# Patient Record
Sex: MALE | Race: WHITE | NOT HISPANIC OR LATINO | URBAN - METROPOLITAN AREA
[De-identification: names, ages, dates, MRNs, and addresses within clinical notes are randomized per-mention and may not be internally consistent; named-entity substitution may affect disease eponyms.]

---

## 2017-01-14 ENCOUNTER — INPATIENT (INPATIENT)
Facility: HOSPITAL | Age: 82
LOS: 5 days | Discharge: HOME | DRG: 178 | End: 2017-01-20
Attending: GENERAL ACUTE CARE HOSPITAL | Admitting: GENERAL ACUTE CARE HOSPITAL
Payer: MEDICARE

## 2017-01-14 VITALS
SYSTOLIC BLOOD PRESSURE: 123 MMHG | HEART RATE: 84 BPM | DIASTOLIC BLOOD PRESSURE: 78 MMHG | TEMPERATURE: 99 F | RESPIRATION RATE: 22 BRPM | OXYGEN SATURATION: 100 %

## 2017-01-14 DIAGNOSIS — E11.22 TYPE 2 DIABETES MELLITUS WITH DIABETIC CHRONIC KIDNEY DISEASE: ICD-10-CM

## 2017-01-14 DIAGNOSIS — I31.3 PERICARDIAL EFFUSION (NONINFLAMMATORY): ICD-10-CM

## 2017-01-14 DIAGNOSIS — J18.9 PNEUMONIA, UNSPECIFIED ORGANISM: ICD-10-CM

## 2017-01-14 DIAGNOSIS — Z98.89 OTHER SPECIFIED POSTPROCEDURAL STATES: Chronic | ICD-10-CM

## 2017-01-14 DIAGNOSIS — E03.9 HYPOTHYROIDISM, UNSPECIFIED: ICD-10-CM

## 2017-01-14 DIAGNOSIS — N18.3 CHRONIC KIDNEY DISEASE, STAGE 3 (MODERATE): ICD-10-CM

## 2017-01-14 DIAGNOSIS — Z90.49 ACQUIRED ABSENCE OF OTHER SPECIFIED PARTS OF DIGESTIVE TRACT: Chronic | ICD-10-CM

## 2017-01-14 DIAGNOSIS — F32.9 MAJOR DEPRESSIVE DISORDER, SINGLE EPISODE, UNSPECIFIED: ICD-10-CM

## 2017-01-14 DIAGNOSIS — S32.9XXA FRACTURE OF UNSPECIFIED PARTS OF LUMBOSACRAL SPINE AND PELVIS, INITIAL ENCOUNTER FOR CLOSED FRACTURE: Chronic | ICD-10-CM

## 2017-01-14 DIAGNOSIS — I48.91 UNSPECIFIED ATRIAL FIBRILLATION: ICD-10-CM

## 2017-01-14 LAB
ALBUMIN SERPL ELPH-MCNC: 3.7 G/DL — SIGNIFICANT CHANGE UP (ref 3.3–5)
ALP SERPL-CCNC: 96 U/L — SIGNIFICANT CHANGE UP (ref 40–120)
ALT FLD-CCNC: 18 U/L RC — SIGNIFICANT CHANGE UP (ref 10–45)
ANION GAP SERPL CALC-SCNC: 15 MMOL/L — SIGNIFICANT CHANGE UP (ref 5–17)
APPEARANCE UR: ABNORMAL
AST SERPL-CCNC: 19 U/L — SIGNIFICANT CHANGE UP (ref 10–40)
BACTERIA # UR AUTO: ABNORMAL /HPF
BASOPHILS # BLD AUTO: 0 K/UL — SIGNIFICANT CHANGE UP (ref 0–0.2)
BASOPHILS NFR BLD AUTO: 0.1 % — SIGNIFICANT CHANGE UP (ref 0–2)
BILIRUB SERPL-MCNC: 0.6 MG/DL — SIGNIFICANT CHANGE UP (ref 0.2–1.2)
BILIRUB UR-MCNC: NEGATIVE — SIGNIFICANT CHANGE UP
BUN SERPL-MCNC: 55 MG/DL — HIGH (ref 7–23)
CALCIUM SERPL-MCNC: 9.4 MG/DL — SIGNIFICANT CHANGE UP (ref 8.4–10.5)
CHLORIDE SERPL-SCNC: 104 MMOL/L — SIGNIFICANT CHANGE UP (ref 96–108)
CO2 SERPL-SCNC: 20 MMOL/L — LOW (ref 22–31)
COLOR SPEC: YELLOW — SIGNIFICANT CHANGE UP
CREAT SERPL-MCNC: 1.56 MG/DL — HIGH (ref 0.5–1.3)
DIFF PNL FLD: ABNORMAL
EOSINOPHIL # BLD AUTO: 0 K/UL — SIGNIFICANT CHANGE UP (ref 0–0.5)
EOSINOPHIL NFR BLD AUTO: 0.1 % — SIGNIFICANT CHANGE UP (ref 0–6)
GAS PNL BLDV: SIGNIFICANT CHANGE UP
GLUCOSE SERPL-MCNC: 198 MG/DL — HIGH (ref 70–99)
GLUCOSE UR QL: NEGATIVE — SIGNIFICANT CHANGE UP
HCT VFR BLD CALC: 29.5 % — LOW (ref 39–50)
HGB BLD-MCNC: 9.9 G/DL — LOW (ref 13–17)
KETONES UR-MCNC: NEGATIVE — SIGNIFICANT CHANGE UP
LEUKOCYTE ESTERASE UR-ACNC: ABNORMAL
LYMPHOCYTES # BLD AUTO: 1 K/UL — SIGNIFICANT CHANGE UP (ref 1–3.3)
LYMPHOCYTES # BLD AUTO: 6.4 % — LOW (ref 13–44)
MCHC RBC-ENTMCNC: 30.2 PG — SIGNIFICANT CHANGE UP (ref 27–34)
MCHC RBC-ENTMCNC: 33.5 GM/DL — SIGNIFICANT CHANGE UP (ref 32–36)
MCV RBC AUTO: 90 FL — SIGNIFICANT CHANGE UP (ref 80–100)
MONOCYTES # BLD AUTO: 1 K/UL — HIGH (ref 0–0.9)
MONOCYTES NFR BLD AUTO: 6.7 % — SIGNIFICANT CHANGE UP (ref 2–14)
NEUTROPHILS # BLD AUTO: 13.2 K/UL — HIGH (ref 1.8–7.4)
NEUTROPHILS NFR BLD AUTO: 86.7 % — HIGH (ref 43–77)
NITRITE UR-MCNC: NEGATIVE — SIGNIFICANT CHANGE UP
NT-PROBNP SERPL-SCNC: 4697 PG/ML — HIGH (ref 0–300)
PH UR: 6 — SIGNIFICANT CHANGE UP (ref 4.8–8)
PLATELET # BLD AUTO: 265 K/UL — SIGNIFICANT CHANGE UP (ref 150–400)
POTASSIUM SERPL-MCNC: 4.4 MMOL/L — SIGNIFICANT CHANGE UP (ref 3.5–5.3)
POTASSIUM SERPL-SCNC: 4.4 MMOL/L — SIGNIFICANT CHANGE UP (ref 3.5–5.3)
PROT SERPL-MCNC: 7.7 G/DL — SIGNIFICANT CHANGE UP (ref 6–8.3)
PROT UR-MCNC: >300 MG/DL — SIGNIFICANT CHANGE UP
RAPID RVP RESULT: SIGNIFICANT CHANGE UP
RBC # BLD: 3.28 M/UL — LOW (ref 4.2–5.8)
RBC # FLD: 14 % — SIGNIFICANT CHANGE UP (ref 10.3–14.5)
RBC CASTS # UR COMP ASSIST: ABNORMAL /HPF (ref 0–2)
SODIUM SERPL-SCNC: 139 MMOL/L — SIGNIFICANT CHANGE UP (ref 135–145)
SP GR SPEC: 1.02 — SIGNIFICANT CHANGE UP (ref 1.01–1.02)
TROPONIN T SERPL-MCNC: 0.06 NG/ML — SIGNIFICANT CHANGE UP (ref 0–0.06)
UROBILINOGEN FLD QL: NEGATIVE — SIGNIFICANT CHANGE UP
WBC # BLD: 15.2 K/UL — HIGH (ref 3.8–10.5)
WBC # FLD AUTO: 15.2 K/UL — HIGH (ref 3.8–10.5)
WBC UR QL: ABNORMAL /HPF (ref 0–5)

## 2017-01-14 PROCEDURE — 76604 US EXAM CHEST: CPT | Mod: 26

## 2017-01-14 PROCEDURE — 71010: CPT | Mod: 26

## 2017-01-14 PROCEDURE — 99291 CRITICAL CARE FIRST HOUR: CPT | Mod: 25,GC

## 2017-01-14 PROCEDURE — 93010 ELECTROCARDIOGRAM REPORT: CPT

## 2017-01-14 PROCEDURE — 99223 1ST HOSP IP/OBS HIGH 75: CPT

## 2017-01-14 PROCEDURE — 93308 TTE F-UP OR LMTD: CPT | Mod: 26

## 2017-01-14 RX ORDER — ACETYLCYSTEINE 200 MG/ML
3 VIAL (ML) MISCELLANEOUS ONCE
Qty: 0 | Refills: 0 | Status: COMPLETED | OUTPATIENT
Start: 2017-01-14 | End: 2017-01-14

## 2017-01-14 RX ORDER — INSULIN GLARGINE 100 [IU]/ML
12 INJECTION, SOLUTION SUBCUTANEOUS AT BEDTIME
Qty: 0 | Refills: 0 | Status: DISCONTINUED | OUTPATIENT
Start: 2017-01-14 | End: 2017-01-14

## 2017-01-14 RX ORDER — SODIUM CHLORIDE 9 MG/ML
1000 INJECTION, SOLUTION INTRAVENOUS
Qty: 0 | Refills: 0 | Status: DISCONTINUED | OUTPATIENT
Start: 2017-01-14 | End: 2017-01-20

## 2017-01-14 RX ORDER — DEXTROSE 50 % IN WATER 50 %
25 SYRINGE (ML) INTRAVENOUS ONCE
Qty: 0 | Refills: 0 | Status: DISCONTINUED | OUTPATIENT
Start: 2017-01-14 | End: 2017-01-20

## 2017-01-14 RX ORDER — LATANOPROST 0.05 MG/ML
1 SOLUTION/ DROPS OPHTHALMIC; TOPICAL AT BEDTIME
Qty: 0 | Refills: 0 | Status: DISCONTINUED | OUTPATIENT
Start: 2017-01-14 | End: 2017-01-20

## 2017-01-14 RX ORDER — CEFEPIME 1 G/1
1000 INJECTION, POWDER, FOR SOLUTION INTRAMUSCULAR; INTRAVENOUS ONCE
Qty: 0 | Refills: 0 | Status: COMPLETED | OUTPATIENT
Start: 2017-01-14 | End: 2017-01-14

## 2017-01-14 RX ORDER — GLUCAGON INJECTION, SOLUTION 0.5 MG/.1ML
1 INJECTION, SOLUTION SUBCUTANEOUS ONCE
Qty: 0 | Refills: 0 | Status: DISCONTINUED | OUTPATIENT
Start: 2017-01-14 | End: 2017-01-20

## 2017-01-14 RX ORDER — INSULIN GLARGINE 100 [IU]/ML
6 INJECTION, SOLUTION SUBCUTANEOUS AT BEDTIME
Qty: 0 | Refills: 0 | Status: DISCONTINUED | OUTPATIENT
Start: 2017-01-14 | End: 2017-01-18

## 2017-01-14 RX ORDER — ASPIRIN/CALCIUM CARB/MAGNESIUM 324 MG
81 TABLET ORAL DAILY
Qty: 0 | Refills: 0 | Status: DISCONTINUED | OUTPATIENT
Start: 2017-01-14 | End: 2017-01-20

## 2017-01-14 RX ORDER — FUROSEMIDE 40 MG
40 TABLET ORAL ONCE
Qty: 0 | Refills: 0 | Status: COMPLETED | OUTPATIENT
Start: 2017-01-14 | End: 2017-01-14

## 2017-01-14 RX ORDER — AZITHROMYCIN 500 MG/1
500 TABLET, FILM COATED ORAL ONCE
Qty: 0 | Refills: 0 | Status: COMPLETED | OUTPATIENT
Start: 2017-01-14 | End: 2017-01-14

## 2017-01-14 RX ORDER — DEXTROSE 50 % IN WATER 50 %
1 SYRINGE (ML) INTRAVENOUS ONCE
Qty: 0 | Refills: 0 | Status: DISCONTINUED | OUTPATIENT
Start: 2017-01-14 | End: 2017-01-20

## 2017-01-14 RX ORDER — INSULIN LISPRO 100/ML
VIAL (ML) SUBCUTANEOUS AT BEDTIME
Qty: 0 | Refills: 0 | Status: DISCONTINUED | OUTPATIENT
Start: 2017-01-14 | End: 2017-01-16

## 2017-01-14 RX ORDER — PIPERACILLIN AND TAZOBACTAM 4; .5 G/20ML; G/20ML
3.38 INJECTION, POWDER, LYOPHILIZED, FOR SOLUTION INTRAVENOUS ONCE
Qty: 0 | Refills: 0 | Status: COMPLETED | OUTPATIENT
Start: 2017-01-14 | End: 2017-01-14

## 2017-01-14 RX ORDER — TAMSULOSIN HYDROCHLORIDE 0.4 MG/1
0.4 CAPSULE ORAL
Qty: 0 | Refills: 0 | Status: DISCONTINUED | OUTPATIENT
Start: 2017-01-14 | End: 2017-01-20

## 2017-01-14 RX ORDER — CEFTRIAXONE 500 MG/1
1 INJECTION, POWDER, FOR SOLUTION INTRAMUSCULAR; INTRAVENOUS EVERY 24 HOURS
Qty: 0 | Refills: 0 | Status: DISCONTINUED | OUTPATIENT
Start: 2017-01-15 | End: 2017-01-18

## 2017-01-14 RX ORDER — DEXTROSE 50 % IN WATER 50 %
12.5 SYRINGE (ML) INTRAVENOUS ONCE
Qty: 0 | Refills: 0 | Status: DISCONTINUED | OUTPATIENT
Start: 2017-01-14 | End: 2017-01-20

## 2017-01-14 RX ORDER — HEPARIN SODIUM 5000 [USP'U]/ML
5000 INJECTION INTRAVENOUS; SUBCUTANEOUS EVERY 8 HOURS
Qty: 0 | Refills: 0 | Status: DISCONTINUED | OUTPATIENT
Start: 2017-01-14 | End: 2017-01-18

## 2017-01-14 RX ORDER — INSULIN LISPRO 100/ML
VIAL (ML) SUBCUTANEOUS
Qty: 0 | Refills: 0 | Status: DISCONTINUED | OUTPATIENT
Start: 2017-01-14 | End: 2017-01-15

## 2017-01-14 RX ORDER — VANCOMYCIN HCL 1 G
1000 VIAL (EA) INTRAVENOUS ONCE
Qty: 0 | Refills: 0 | Status: COMPLETED | OUTPATIENT
Start: 2017-01-14 | End: 2017-01-14

## 2017-01-14 RX ORDER — IPRATROPIUM/ALBUTEROL SULFATE 18-103MCG
3 AEROSOL WITH ADAPTER (GRAM) INHALATION EVERY 6 HOURS
Qty: 0 | Refills: 0 | Status: DISCONTINUED | OUTPATIENT
Start: 2017-01-14 | End: 2017-01-20

## 2017-01-14 RX ORDER — DOXAZOSIN MESYLATE 4 MG
4 TABLET ORAL AT BEDTIME
Qty: 0 | Refills: 0 | Status: DISCONTINUED | OUTPATIENT
Start: 2017-01-14 | End: 2017-01-20

## 2017-01-14 RX ORDER — IPRATROPIUM BROMIDE 0.2 MG/ML
500 SOLUTION, NON-ORAL INHALATION EVERY 6 HOURS
Qty: 0 | Refills: 0 | Status: DISCONTINUED | OUTPATIENT
Start: 2017-01-14 | End: 2017-01-20

## 2017-01-14 RX ORDER — LEVOTHYROXINE SODIUM 125 MCG
100 TABLET ORAL DAILY
Qty: 0 | Refills: 0 | Status: DISCONTINUED | OUTPATIENT
Start: 2017-01-14 | End: 2017-01-20

## 2017-01-14 RX ORDER — ASCORBIC ACID 60 MG
500 TABLET,CHEWABLE ORAL DAILY
Qty: 0 | Refills: 0 | Status: DISCONTINUED | OUTPATIENT
Start: 2017-01-14 | End: 2017-01-20

## 2017-01-14 RX ORDER — SERTRALINE 25 MG/1
50 TABLET, FILM COATED ORAL DAILY
Qty: 0 | Refills: 0 | Status: DISCONTINUED | OUTPATIENT
Start: 2017-01-14 | End: 2017-01-20

## 2017-01-14 RX ADMIN — Medication 81 MILLIGRAM(S): at 17:16

## 2017-01-14 RX ADMIN — TAMSULOSIN HYDROCHLORIDE 0.4 MILLIGRAM(S): 0.4 CAPSULE ORAL at 17:16

## 2017-01-14 RX ADMIN — HEPARIN SODIUM 5000 UNIT(S): 5000 INJECTION INTRAVENOUS; SUBCUTANEOUS at 22:20

## 2017-01-14 RX ADMIN — Medication 4 MILLIGRAM(S): at 22:33

## 2017-01-14 RX ADMIN — Medication 500 MILLIGRAM(S): at 17:16

## 2017-01-14 RX ADMIN — Medication 40 MILLIGRAM(S): at 11:44

## 2017-01-14 RX ADMIN — Medication 1 TABLET(S): at 17:16

## 2017-01-14 RX ADMIN — AZITHROMYCIN 250 MILLIGRAM(S): 500 TABLET, FILM COATED ORAL at 11:07

## 2017-01-14 RX ADMIN — Medication 100 MICROGRAM(S): at 17:16

## 2017-01-14 RX ADMIN — Medication 3 MILLILITER(S): at 15:09

## 2017-01-14 RX ADMIN — INSULIN GLARGINE 6 UNIT(S): 100 INJECTION, SOLUTION SUBCUTANEOUS at 22:33

## 2017-01-14 RX ADMIN — Medication 250 MILLIGRAM(S): at 09:19

## 2017-01-14 RX ADMIN — CEFEPIME 100 MILLIGRAM(S): 1 INJECTION, POWDER, FOR SOLUTION INTRAMUSCULAR; INTRAVENOUS at 11:29

## 2017-01-14 NOTE — H&P ADULT. - GASTROINTESTINAL DETAILS
soft/no rebound tenderness/no masses palpable/no bruit/nontender/no distention/bowel sounds normal/no guarding

## 2017-01-14 NOTE — H&P ADULT. - PROBLEM SELECTOR PLAN 1
1. Admit to medicine.  2. ID consulted, recommending to stop vanco/cefepime/azithromycin and start ceftriaxone 1 g IV q24h and doxycycline 100 mg PO daily. 1. Admit to medicine.  2. ID consulted, recommending to stop vanco/cefepime/azithromycin and start ceftriaxone 1 g IV q24h and doxycycline 100 mg PO daily.  3. F/U blood cultures.  4. Check urine for legionella.  5. Guaifenesin ATC  6. Chest PT for secretions.  7. Aspiration precautions.

## 2017-01-14 NOTE — ED PROVIDER NOTE - MEDICAL DECISION MAKING DETAILS
89 y.o. male pw shortness of breath and productive cough, concern for PNA. Will check labs, cxr, US, rvp, reevaluate.

## 2017-01-14 NOTE — H&P ADULT. - HISTORY OF PRESENT ILLNESS
89 year old male with PMHx of atrial fibrillation on aspirin (not on anticoagulation due to history of RP bleeding), T2DM, CKD stage III, BPH with chronic indwelling Cabrera, hypothyroidism, depression presenting from assisted living for evaluation of cough. The patient states that 2-3 days ago, he developed a cough. The cough is associated with sputum production, but it is difficult for him to expectorate. When he does, the sputum is dark in color. He also reports subjective dyspnea on exertion. He denies fever, chills, nausea, or vomiting, but does report a few loose stools over the past 24 hours. No recent antibiotic exposure. Was hospitalized here approximately one month ago. Denies sick contacts. He was recently prescribed a nasal spray to help with his mucous, but made symptoms worse and more difficult for him to cough up mucous. This has been associated with a slight change in his voice as well.

## 2017-01-14 NOTE — ED PROVIDER NOTE - OBJECTIVE STATEMENT
89 y.o. male hx of CAD, CHF, HTN from [facility] pw productive cough x 1 week, now with worsening shortness of breath since last night and difficulty breathing. Pt denies fevers. No chest pain.     ROS: denies HA, new weakness, dizziness, fevers/chills, nausea/vomiting, chest pain, diaphoresis, abdominal pain, back/neck pain, dysuria/hematuria, or rash

## 2017-01-14 NOTE — ED ADULT NURSE NOTE - OBJECTIVE STATEMENT
Pt arrived to ER from assisted living c/o cough that he has had for about a week that has progressed to increasing SOB and PANIAGUA since yesterday.  Pt aaox3, pt 100 on room air, pt with audible junky secrections in upper airway.  Pt with rales and rhonchi audible bilaterally.  Pt in atrial fibrillation, pt denies any CP or dizziness.  Pt VSS, will continue to monitor.

## 2017-01-14 NOTE — H&P ADULT. - LAB RESULTS AND INTERPRETATION
Leukocytosis. Normocytic anemia near baseline. Elevated BUN and creatnine, near baseline. BNP elevated to 4700.

## 2017-01-14 NOTE — ED PROVIDER NOTE - CARE PLAN
Principal Discharge DX:	Pneumonia of right lung due to infectious organism, unspecified part of lung

## 2017-01-14 NOTE — H&P ADULT. - PMH
Anemia    Atrial fibrillation, unspecified type    Back pain  chronic  BPH (Benign Prostatic Hyperplasia)    CKD (chronic kidney disease)    DM2 (diabetes mellitus, type 2)    Gallstone pancreatitis  2013  Glaucoma    Hepatitis  unknown kind, in the [employer] 1940's  LFT's wnl 3/2015  Hypothyroidism    Pneumonia    Retroperitoneal bleed

## 2017-01-14 NOTE — ED PROVIDER NOTE - ATTENDING CONTRIBUTION TO CARE
I performed a face to face evaluation of this patient and performed a history and physical examination on the patient.  I agree with the resident's history, physical examination, and plan of the patient. patient from assisted living complaining of sob x 1 week, cough. as per family patient with voice change as well. in ED, non labored breathing, rhonchorous, on NC. US shows right sided pneumonia and bilateral B-lines. patient in chf exacerbation as well from pneumonia.

## 2017-01-14 NOTE — ED PROVIDER NOTE - PROGRESS NOTE DETAILS
Attending Note (Derian): family called and stated has penicillin allergy. not previously documented. patient received small amount of zosyn. med stopped once informed. will monitor for signs of allergic reaction. no signs of allergic reaction at this time.

## 2017-01-14 NOTE — H&P ADULT. - PROBLEM SELECTOR PLAN 7
BUN/creatinine near baseline.  1. Trend BMP.  2. Maintain Cabrera.  3. Outpt urology follow-up.  4. Renal dosing of medications.

## 2017-01-14 NOTE — ED ADULT NURSE NOTE - PSH
Closed pelvic fracture  11/2015  S/P appendectomy  as a child  S/P cholecystectomy  6/2013  S/P hernia repair    S/P hip replacement  bilateral, in his 70's  S/P shoulder joint replacement  right

## 2017-01-14 NOTE — H&P ADULT. - PROBLEM SELECTOR PLAN 2
Rate controlled. Cardiology consult appreciated. No evidence for rapid rhythm or significant CHF. Patient received lasix in ED.  1. Check EKG.  2. Continue aspirin.  3. Cardiology follow up.

## 2017-01-14 NOTE — H&P ADULT. - PROBLEM SELECTOR PLAN 4
Likely chronic and stable; noted on previous TTE.  1. Repeat TTE, inpatient vs. outpatient.  2. Cardiology f/u.

## 2017-01-14 NOTE — ED PROVIDER NOTE - CRITICAL CARE PROVIDED
documentation/telephone consultation with the patient's family/consult w/ pt's family directly relating to pts condition/interpretation of diagnostic studies/direct patient care (not related to procedure)/additional history taking

## 2017-01-14 NOTE — H&P ADULT. - RADIOLOGY RESULTS AND INTERPRETATION
CXR personally reviewed: Clear lungs  TTE performed in ED: Small pericardial effusion (was noted on TTE from 2015)

## 2017-01-14 NOTE — H&P ADULT. - ASSESSMENT
89 year old male with PMHx of atrial fibrillation on aspirin (not on anticoagulation due to history of RP bleeding), T2DM, CKD stage III, BPH with chronic indwelling Cabrera, hypothyroidism, depression presenting from assisted living for evaluation of cough. Here, with tachypnea, leukocytosis, and BUN/creatinine near baseline. RVP and CXR negative. Exam with diffuse rhonchi. Suspect bronchitis vs. community acquired pneumonia. Although BNP is elevated (which may be exacerbated by renal disease), doubt significant CHF exacerbation given lack of LE edema and benign appearance of chest XR.

## 2017-01-15 LAB
ALBUMIN SERPL ELPH-MCNC: 3.6 G/DL — SIGNIFICANT CHANGE UP (ref 3.3–5)
ALP SERPL-CCNC: 92 U/L — SIGNIFICANT CHANGE UP (ref 40–120)
ALT FLD-CCNC: 14 U/L — SIGNIFICANT CHANGE UP (ref 10–45)
ANION GAP SERPL CALC-SCNC: 17 MMOL/L — SIGNIFICANT CHANGE UP (ref 5–17)
AST SERPL-CCNC: 18 U/L — SIGNIFICANT CHANGE UP (ref 10–40)
BASOPHILS # BLD AUTO: 0.03 K/UL — SIGNIFICANT CHANGE UP (ref 0–0.2)
BASOPHILS NFR BLD AUTO: 0.2 % — SIGNIFICANT CHANGE UP (ref 0–2)
BILIRUB SERPL-MCNC: 0.6 MG/DL — SIGNIFICANT CHANGE UP (ref 0.2–1.2)
BUN SERPL-MCNC: 58 MG/DL — HIGH (ref 7–23)
CALCIUM SERPL-MCNC: 9.5 MG/DL — SIGNIFICANT CHANGE UP (ref 8.4–10.5)
CHLORIDE SERPL-SCNC: 105 MMOL/L — SIGNIFICANT CHANGE UP (ref 96–108)
CO2 SERPL-SCNC: 19 MMOL/L — LOW (ref 22–31)
CREAT SERPL-MCNC: 1.6 MG/DL — HIGH (ref 0.5–1.3)
CULTURE RESULTS: SIGNIFICANT CHANGE UP
EOSINOPHIL # BLD AUTO: 0.09 K/UL — SIGNIFICANT CHANGE UP (ref 0–0.5)
EOSINOPHIL NFR BLD AUTO: 0.7 % — SIGNIFICANT CHANGE UP (ref 0–6)
GLUCOSE SERPL-MCNC: 146 MG/DL — HIGH (ref 70–99)
HCT VFR BLD CALC: 30.2 % — LOW (ref 39–50)
HGB BLD-MCNC: 9.7 G/DL — LOW (ref 13–17)
IMM GRANULOCYTES NFR BLD AUTO: 0.3 % — SIGNIFICANT CHANGE UP (ref 0–1.5)
LEGIONELLA AG UR QL: NEGATIVE — SIGNIFICANT CHANGE UP
LYMPHOCYTES # BLD AUTO: 1.8 K/UL — SIGNIFICANT CHANGE UP (ref 1–3.3)
LYMPHOCYTES # BLD AUTO: 14 % — SIGNIFICANT CHANGE UP (ref 13–44)
MCHC RBC-ENTMCNC: 28.4 PG — SIGNIFICANT CHANGE UP (ref 27–34)
MCHC RBC-ENTMCNC: 32.1 GM/DL — SIGNIFICANT CHANGE UP (ref 32–36)
MCV RBC AUTO: 88.6 FL — SIGNIFICANT CHANGE UP (ref 80–100)
MONOCYTES # BLD AUTO: 1.06 K/UL — HIGH (ref 0–0.9)
MONOCYTES NFR BLD AUTO: 8.2 % — SIGNIFICANT CHANGE UP (ref 2–14)
NEUTROPHILS # BLD AUTO: 9.85 K/UL — HIGH (ref 1.8–7.4)
NEUTROPHILS NFR BLD AUTO: 76.6 % — SIGNIFICANT CHANGE UP (ref 43–77)
PLATELET # BLD AUTO: 314 K/UL — SIGNIFICANT CHANGE UP (ref 150–400)
POTASSIUM SERPL-MCNC: 4.4 MMOL/L — SIGNIFICANT CHANGE UP (ref 3.5–5.3)
POTASSIUM SERPL-SCNC: 4.4 MMOL/L — SIGNIFICANT CHANGE UP (ref 3.5–5.3)
PROT SERPL-MCNC: 7.1 G/DL — SIGNIFICANT CHANGE UP (ref 6–8.3)
RBC # BLD: 3.41 M/UL — LOW (ref 4.2–5.8)
RBC # FLD: 15.8 % — HIGH (ref 10.3–14.5)
SODIUM SERPL-SCNC: 141 MMOL/L — SIGNIFICANT CHANGE UP (ref 135–145)
SPECIMEN SOURCE: SIGNIFICANT CHANGE UP
WBC # BLD: 12.87 K/UL — HIGH (ref 3.8–10.5)
WBC # FLD AUTO: 12.87 K/UL — HIGH (ref 3.8–10.5)

## 2017-01-15 RX ORDER — INSULIN LISPRO 100/ML
VIAL (ML) SUBCUTANEOUS EVERY 6 HOURS
Qty: 0 | Refills: 0 | Status: DISCONTINUED | OUTPATIENT
Start: 2017-01-15 | End: 2017-01-15

## 2017-01-15 RX ORDER — SODIUM CHLORIDE 9 MG/ML
1000 INJECTION INTRAMUSCULAR; INTRAVENOUS; SUBCUTANEOUS
Qty: 0 | Refills: 0 | Status: DISCONTINUED | OUTPATIENT
Start: 2017-01-15 | End: 2017-01-20

## 2017-01-15 RX ORDER — INSULIN LISPRO 100/ML
VIAL (ML) SUBCUTANEOUS
Qty: 0 | Refills: 0 | Status: DISCONTINUED | OUTPATIENT
Start: 2017-01-15 | End: 2017-01-16

## 2017-01-15 RX ADMIN — Medication 500 MICROGRAM(S): at 17:46

## 2017-01-15 RX ADMIN — Medication 2: at 17:46

## 2017-01-15 RX ADMIN — Medication 30 MILLIGRAM(S): at 21:55

## 2017-01-15 RX ADMIN — Medication 500 MILLIGRAM(S): at 12:54

## 2017-01-15 RX ADMIN — LATANOPROST 1 DROP(S): 0.05 SOLUTION/ DROPS OPHTHALMIC; TOPICAL at 00:39

## 2017-01-15 RX ADMIN — Medication 100 MILLIGRAM(S): at 05:42

## 2017-01-15 RX ADMIN — SODIUM CHLORIDE 40 MILLILITER(S): 9 INJECTION INTRAMUSCULAR; INTRAVENOUS; SUBCUTANEOUS at 13:00

## 2017-01-15 RX ADMIN — Medication 1 TABLET(S): at 12:54

## 2017-01-15 RX ADMIN — TAMSULOSIN HYDROCHLORIDE 0.4 MILLIGRAM(S): 0.4 CAPSULE ORAL at 17:47

## 2017-01-15 RX ADMIN — Medication 3 MILLILITER(S): at 05:42

## 2017-01-15 RX ADMIN — Medication 3 MILLILITER(S): at 12:53

## 2017-01-15 RX ADMIN — Medication 3 MILLILITER(S): at 00:39

## 2017-01-15 RX ADMIN — Medication 500 MICROGRAM(S): at 12:53

## 2017-01-15 RX ADMIN — TAMSULOSIN HYDROCHLORIDE 0.4 MILLIGRAM(S): 0.4 CAPSULE ORAL at 05:43

## 2017-01-15 RX ADMIN — Medication 100 MICROGRAM(S): at 05:42

## 2017-01-15 RX ADMIN — Medication 3 MILLILITER(S): at 17:46

## 2017-01-15 RX ADMIN — SERTRALINE 50 MILLIGRAM(S): 25 TABLET, FILM COATED ORAL at 12:54

## 2017-01-15 RX ADMIN — Medication 100 MILLIGRAM(S): at 17:50

## 2017-01-15 RX ADMIN — Medication 81 MILLIGRAM(S): at 12:54

## 2017-01-15 RX ADMIN — Medication 500 MICROGRAM(S): at 05:42

## 2017-01-15 RX ADMIN — HEPARIN SODIUM 5000 UNIT(S): 5000 INJECTION INTRAVENOUS; SUBCUTANEOUS at 21:56

## 2017-01-15 RX ADMIN — HEPARIN SODIUM 5000 UNIT(S): 5000 INJECTION INTRAVENOUS; SUBCUTANEOUS at 05:42

## 2017-01-15 RX ADMIN — CEFTRIAXONE 100 GRAM(S): 500 INJECTION, POWDER, FOR SOLUTION INTRAMUSCULAR; INTRAVENOUS at 09:00

## 2017-01-15 RX ADMIN — Medication 4 MILLIGRAM(S): at 21:55

## 2017-01-15 RX ADMIN — INSULIN GLARGINE 6 UNIT(S): 100 INJECTION, SOLUTION SUBCUTANEOUS at 21:55

## 2017-01-15 RX ADMIN — LATANOPROST 1 DROP(S): 0.05 SOLUTION/ DROPS OPHTHALMIC; TOPICAL at 21:56

## 2017-01-15 RX ADMIN — HEPARIN SODIUM 5000 UNIT(S): 5000 INJECTION INTRAVENOUS; SUBCUTANEOUS at 13:00

## 2017-01-15 RX ADMIN — Medication 500 MICROGRAM(S): at 00:39

## 2017-01-15 NOTE — PROVIDER CONTACT NOTE (OTHER) - ASSESSMENT
KONSTANTIN Russo aware. RN d/w Pt and family risks of eating re asp PNA. If family still persists to ask for diet KONSTANTIN Russo will come and d/w risks with Pt and family. Family aware that hospital not responsible if Pt gets asp PNA if they do decide to request diet. Family wishes to hold off on diet at this time

## 2017-01-15 NOTE — PROVIDER CONTACT NOTE (OTHER) - ASSESSMENT
Pt and Pt family educated on risks of not complying with recommended treatment. Pt and Pt family aware of risk of asp PNA. Pt and Pt family still wish to have diet. KONSTANTIN Russo to d/w further with family

## 2017-01-15 NOTE — PROVIDER CONTACT NOTE (OTHER) - SITUATION
Pt dx with PNA 1/14, NPO at this time pending s&s, hx of DM2, RN asking for FS q6 instead of FSACHS. Pt also came with chronic hunt, RN asking for hunt order

## 2017-01-16 LAB
ANION GAP SERPL CALC-SCNC: 15 MMOL/L — SIGNIFICANT CHANGE UP (ref 5–17)
BUN SERPL-MCNC: 53 MG/DL — HIGH (ref 7–23)
CALCIUM SERPL-MCNC: 9.2 MG/DL — SIGNIFICANT CHANGE UP (ref 8.4–10.5)
CHLORIDE SERPL-SCNC: 107 MMOL/L — SIGNIFICANT CHANGE UP (ref 96–108)
CO2 SERPL-SCNC: 20 MMOL/L — LOW (ref 22–31)
CREAT SERPL-MCNC: 1.49 MG/DL — HIGH (ref 0.5–1.3)
GLUCOSE SERPL-MCNC: 154 MG/DL — HIGH (ref 70–99)
HCT VFR BLD CALC: 29.3 % — LOW (ref 39–50)
HGB BLD-MCNC: 9.4 G/DL — LOW (ref 13–17)
MCHC RBC-ENTMCNC: 28.6 PG — SIGNIFICANT CHANGE UP (ref 27–34)
MCHC RBC-ENTMCNC: 32.1 GM/DL — SIGNIFICANT CHANGE UP (ref 32–36)
MCV RBC AUTO: 89.1 FL — SIGNIFICANT CHANGE UP (ref 80–100)
PLATELET # BLD AUTO: 307 K/UL — SIGNIFICANT CHANGE UP (ref 150–400)
POTASSIUM SERPL-MCNC: 4.1 MMOL/L — SIGNIFICANT CHANGE UP (ref 3.5–5.3)
POTASSIUM SERPL-SCNC: 4.1 MMOL/L — SIGNIFICANT CHANGE UP (ref 3.5–5.3)
RBC # BLD: 3.29 M/UL — LOW (ref 4.2–5.8)
RBC # FLD: 15.7 % — HIGH (ref 10.3–14.5)
SODIUM SERPL-SCNC: 142 MMOL/L — SIGNIFICANT CHANGE UP (ref 135–145)
WBC # BLD: 9.02 K/UL — SIGNIFICANT CHANGE UP (ref 3.8–10.5)
WBC # FLD AUTO: 9.02 K/UL — SIGNIFICANT CHANGE UP (ref 3.8–10.5)

## 2017-01-16 RX ORDER — INSULIN LISPRO 100/ML
VIAL (ML) SUBCUTANEOUS EVERY 6 HOURS
Qty: 0 | Refills: 0 | Status: DISCONTINUED | OUTPATIENT
Start: 2017-01-16 | End: 2017-01-18

## 2017-01-16 RX ADMIN — CEFTRIAXONE 100 GRAM(S): 500 INJECTION, POWDER, FOR SOLUTION INTRAMUSCULAR; INTRAVENOUS at 08:58

## 2017-01-16 RX ADMIN — Medication 81 MILLIGRAM(S): at 12:01

## 2017-01-16 RX ADMIN — HEPARIN SODIUM 5000 UNIT(S): 5000 INJECTION INTRAVENOUS; SUBCUTANEOUS at 22:11

## 2017-01-16 RX ADMIN — Medication 500 MICROGRAM(S): at 12:01

## 2017-01-16 RX ADMIN — Medication 3 MILLILITER(S): at 12:01

## 2017-01-16 RX ADMIN — INSULIN GLARGINE 6 UNIT(S): 100 INJECTION, SOLUTION SUBCUTANEOUS at 22:11

## 2017-01-16 RX ADMIN — Medication 100 MILLIGRAM(S): at 17:22

## 2017-01-16 RX ADMIN — Medication 1 TABLET(S): at 12:01

## 2017-01-16 RX ADMIN — HEPARIN SODIUM 5000 UNIT(S): 5000 INJECTION INTRAVENOUS; SUBCUTANEOUS at 05:23

## 2017-01-16 RX ADMIN — Medication 500 MICROGRAM(S): at 17:22

## 2017-01-16 RX ADMIN — HEPARIN SODIUM 5000 UNIT(S): 5000 INJECTION INTRAVENOUS; SUBCUTANEOUS at 13:57

## 2017-01-16 RX ADMIN — TAMSULOSIN HYDROCHLORIDE 0.4 MILLIGRAM(S): 0.4 CAPSULE ORAL at 17:22

## 2017-01-16 RX ADMIN — Medication 500 MILLIGRAM(S): at 12:01

## 2017-01-16 RX ADMIN — Medication 3 MILLILITER(S): at 05:23

## 2017-01-16 RX ADMIN — SERTRALINE 50 MILLIGRAM(S): 25 TABLET, FILM COATED ORAL at 12:01

## 2017-01-16 RX ADMIN — LATANOPROST 1 DROP(S): 0.05 SOLUTION/ DROPS OPHTHALMIC; TOPICAL at 22:11

## 2017-01-16 RX ADMIN — Medication 100 MICROGRAM(S): at 05:23

## 2017-01-16 RX ADMIN — Medication 100 MILLIGRAM(S): at 05:23

## 2017-01-16 RX ADMIN — Medication 3 MILLILITER(S): at 23:14

## 2017-01-16 RX ADMIN — TAMSULOSIN HYDROCHLORIDE 0.4 MILLIGRAM(S): 0.4 CAPSULE ORAL at 05:23

## 2017-01-16 RX ADMIN — Medication 500 MICROGRAM(S): at 23:14

## 2017-01-16 RX ADMIN — Medication 3 MILLILITER(S): at 17:22

## 2017-01-16 RX ADMIN — Medication 3 MILLILITER(S): at 00:07

## 2017-01-16 RX ADMIN — Medication 500 MICROGRAM(S): at 05:23

## 2017-01-16 RX ADMIN — Medication 500 MICROGRAM(S): at 00:07

## 2017-01-16 RX ADMIN — Medication 4 MILLIGRAM(S): at 22:11

## 2017-01-16 NOTE — SWALLOW BEDSIDE ASSESSMENT ADULT - SWALLOW EVAL: DIAGNOSIS
Pt presents with an oropharyngeal dysphagia marked by signs suggestive of pharyngeal residue and overt s/s of laryngeal penetration and/or aspiration with thick puree.

## 2017-01-16 NOTE — SWALLOW BEDSIDE ASSESSMENT ADULT - ADDITIONAL RECOMMENDATIONS
Maintain good oral hygiene.  Recommendations were d/w pt's son-in-law, Alberto, who is present at bedside, who reports that his spouse, the HCP is home "resting" and is not to be disturbed.

## 2017-01-16 NOTE — SWALLOW BEDSIDE ASSESSMENT ADULT - PHARYNGEAL PHASE
Delayed pharyngeal swallow/Delayed cough post oral intake/Multiple swallows/Decreased laryngeal elevation/Wet vocal quality post oral intake/audible swallow; increase in audible breath sounds->pt encouraged to cough and re-swallow 2x with improvement

## 2017-01-16 NOTE — SWALLOW BEDSIDE ASSESSMENT ADULT - SLP PERTINENT HISTORY OF CURRENT PROBLEM
89 year old male with PMHx of atrial fibrillation on aspirin (not on anticoagulation due to history of RP bleeding), T2DM, CKD stage III, BPH with chronic indwelling Cabrera, hypothyroidism, depression presenting from assisted living for evaluation of cough. Here, with tachypnea, leukocytosis, and BUN/creatinine near baseline. RVP and CXR negative. Exam with diffuse rhonchi. Suspect bronchitis vs. community acquired pneumonia. Although BNP is elevated (which may be exacerbated by renal disease), doubt significant CHF exacerbation given lack of LE edema and benign appearance of chest XR. He was recently prescribed a nasal spray to help with his mucous, but made symptoms worse and more difficult for him to cough up mucous. This has been associated with a slight change in his voice as well.

## 2017-01-16 NOTE — SWALLOW BEDSIDE ASSESSMENT ADULT - SLP GENERAL OBSERVATIONS
Pt OOB in chair, A&Ox2, 3L of O2 via nasal cannula, language mildly tangential; however able to follow commands for evaluation purposes. Wet upper airway sounds upon encounter that family reports worsened with PO intake this AM.

## 2017-01-16 NOTE — SWALLOW BEDSIDE ASSESSMENT ADULT - SWALLOW EVAL: PATIENT/FAMILY GOALS STATEMENT
Pt reports h/o "pain on left side of mouth and throat" for ~1 year. He states that his granddaughter, and internal med MD, has examined him and found "nothing." He endorses choking a few days ago and blames the "inhaler" that he was prescribed for rhinorrhea.

## 2017-01-17 LAB
ANION GAP SERPL CALC-SCNC: 16 MMOL/L — SIGNIFICANT CHANGE UP (ref 5–17)
APPEARANCE UR: ABNORMAL
BILIRUB UR-MCNC: NEGATIVE — SIGNIFICANT CHANGE UP
BUN SERPL-MCNC: 52 MG/DL — HIGH (ref 7–23)
CALCIUM SERPL-MCNC: 9.4 MG/DL — SIGNIFICANT CHANGE UP (ref 8.4–10.5)
CHLORIDE SERPL-SCNC: 108 MMOL/L — SIGNIFICANT CHANGE UP (ref 96–108)
CO2 SERPL-SCNC: 18 MMOL/L — LOW (ref 22–31)
COLOR SPEC: YELLOW — SIGNIFICANT CHANGE UP
CREAT SERPL-MCNC: 1.43 MG/DL — HIGH (ref 0.5–1.3)
DIFF PNL FLD: ABNORMAL
GLUCOSE SERPL-MCNC: 115 MG/DL — HIGH (ref 70–99)
GLUCOSE UR QL: NEGATIVE MG/DL — SIGNIFICANT CHANGE UP
HCT VFR BLD CALC: 29.4 % — LOW (ref 39–50)
HGB BLD-MCNC: 9.2 G/DL — LOW (ref 13–17)
KETONES UR-MCNC: NEGATIVE — SIGNIFICANT CHANGE UP
LEUKOCYTE ESTERASE UR-ACNC: ABNORMAL
MCHC RBC-ENTMCNC: 28.3 PG — SIGNIFICANT CHANGE UP (ref 27–34)
MCHC RBC-ENTMCNC: 31.3 GM/DL — LOW (ref 32–36)
MCV RBC AUTO: 90.5 FL — SIGNIFICANT CHANGE UP (ref 80–100)
NITRITE UR-MCNC: NEGATIVE — SIGNIFICANT CHANGE UP
PH UR: 5.5 — SIGNIFICANT CHANGE UP (ref 5–8)
PLATELET # BLD AUTO: 270 K/UL — SIGNIFICANT CHANGE UP (ref 150–400)
POTASSIUM SERPL-MCNC: 4.2 MMOL/L — SIGNIFICANT CHANGE UP (ref 3.5–5.3)
POTASSIUM SERPL-SCNC: 4.2 MMOL/L — SIGNIFICANT CHANGE UP (ref 3.5–5.3)
PROT UR-MCNC: 100 MG/DL
RBC # BLD: 3.25 M/UL — LOW (ref 4.2–5.8)
RBC # FLD: 16 % — HIGH (ref 10.3–14.5)
SODIUM SERPL-SCNC: 142 MMOL/L — SIGNIFICANT CHANGE UP (ref 135–145)
SP GR SPEC: 1.02 — SIGNIFICANT CHANGE UP (ref 1.01–1.02)
UROBILINOGEN FLD QL: NEGATIVE MG/DL — SIGNIFICANT CHANGE UP
WBC # BLD: 7.78 K/UL — SIGNIFICANT CHANGE UP (ref 3.8–10.5)
WBC # FLD AUTO: 7.78 K/UL — SIGNIFICANT CHANGE UP (ref 3.8–10.5)

## 2017-01-17 PROCEDURE — 74230 X-RAY XM SWLNG FUNCJ C+: CPT | Mod: 26

## 2017-01-17 RX ADMIN — Medication 30 MILLIGRAM(S): at 23:25

## 2017-01-17 RX ADMIN — SERTRALINE 50 MILLIGRAM(S): 25 TABLET, FILM COATED ORAL at 12:47

## 2017-01-17 RX ADMIN — Medication 500 MICROGRAM(S): at 23:28

## 2017-01-17 RX ADMIN — HEPARIN SODIUM 5000 UNIT(S): 5000 INJECTION INTRAVENOUS; SUBCUTANEOUS at 15:07

## 2017-01-17 RX ADMIN — Medication 3 MILLILITER(S): at 23:28

## 2017-01-17 RX ADMIN — LATANOPROST 1 DROP(S): 0.05 SOLUTION/ DROPS OPHTHALMIC; TOPICAL at 22:52

## 2017-01-17 RX ADMIN — INSULIN GLARGINE 6 UNIT(S): 100 INJECTION, SOLUTION SUBCUTANEOUS at 22:52

## 2017-01-17 RX ADMIN — Medication 2: at 17:57

## 2017-01-17 RX ADMIN — Medication 3 MILLILITER(S): at 06:21

## 2017-01-17 RX ADMIN — Medication 500 MICROGRAM(S): at 06:22

## 2017-01-17 RX ADMIN — HEPARIN SODIUM 5000 UNIT(S): 5000 INJECTION INTRAVENOUS; SUBCUTANEOUS at 06:22

## 2017-01-17 RX ADMIN — Medication 4 MILLIGRAM(S): at 22:52

## 2017-01-17 RX ADMIN — Medication 1: at 12:47

## 2017-01-17 RX ADMIN — TAMSULOSIN HYDROCHLORIDE 0.4 MILLIGRAM(S): 0.4 CAPSULE ORAL at 17:55

## 2017-01-17 RX ADMIN — CEFTRIAXONE 100 GRAM(S): 500 INJECTION, POWDER, FOR SOLUTION INTRAMUSCULAR; INTRAVENOUS at 12:48

## 2017-01-17 RX ADMIN — Medication 500 MILLIGRAM(S): at 12:48

## 2017-01-17 RX ADMIN — Medication 100 MICROGRAM(S): at 06:22

## 2017-01-17 RX ADMIN — Medication 3 MILLILITER(S): at 12:47

## 2017-01-17 RX ADMIN — Medication 81 MILLIGRAM(S): at 12:48

## 2017-01-17 RX ADMIN — Medication 3 MILLILITER(S): at 17:55

## 2017-01-17 RX ADMIN — Medication 100 MILLIGRAM(S): at 17:55

## 2017-01-17 RX ADMIN — Medication 100 MILLIGRAM(S): at 06:22

## 2017-01-17 RX ADMIN — TAMSULOSIN HYDROCHLORIDE 0.4 MILLIGRAM(S): 0.4 CAPSULE ORAL at 06:22

## 2017-01-17 RX ADMIN — HEPARIN SODIUM 5000 UNIT(S): 5000 INJECTION INTRAVENOUS; SUBCUTANEOUS at 22:52

## 2017-01-17 RX ADMIN — Medication 1 TABLET(S): at 12:48

## 2017-01-17 RX ADMIN — Medication 500 MICROGRAM(S): at 12:46

## 2017-01-17 RX ADMIN — Medication 500 MICROGRAM(S): at 17:55

## 2017-01-17 NOTE — SWALLOW VFSS/MBS ASSESSMENT ADULT - RESIDUE IN LARYNGEAL VESTIBULE / VENTRICLE
Trace/along laryngeal surface of the epiglottis/Mild along laryngeal surface of the epiglottis; immediately superior to the vocal folds->pt cued to cough to reduce risk of aspiration/Mild Mild along laryngeal surface of the epiglottis, immediately superior to the vocal folds->pt cued to cough to reduced risk of aspiration/Trace/Mild along the laryngeal surface of the epiglottis and along the vocal folds/Mild/Moderate

## 2017-01-17 NOTE — PHYSICAL THERAPY INITIAL EVALUATION ADULT - GAIT DEVIATIONS NOTED, PT EVAL
decreased step length/decreased stride length/decreased gege/increased time in double stance/decreased velocity of limb motion

## 2017-01-17 NOTE — PHYSICAL THERAPY INITIAL EVALUATION ADULT - ADDITIONAL COMMENTS
pt lives with spouse in assisted living facility, with 24/7 aide. PTA, pt required assist with ADLs and functional activities, able to ambulate with RW.

## 2017-01-17 NOTE — SWALLOW VFSS/MBS ASSESSMENT ADULT - ROSENBEK'S PENETRATION ASPIRATION SCALE
(3) contrast remains above the vocal cords, visible residue remains (penetration) cued to cough to prevent further descent of material/(3) contrast remains above the vocal cords, visible residue remains (penetration) (5) contrast contacts vocal cords, visible residue remains (penetration) (6) contrast passes glottis, no subglottic residue remains (aspiration)

## 2017-01-17 NOTE — SWALLOW VFSS/MBS ASSESSMENT ADULT - LARYNGEAL PENETRATION AFTER THE SWALLOW - SILENT
over the arytenoids, to the level of the vocal folds->pt again cued to cough to reduce aspiration/Mild

## 2017-01-17 NOTE — PHYSICAL THERAPY INITIAL EVALUATION ADULT - TRANSFER SAFETY CONCERNS NOTED: SIT/STAND, REHAB EVAL
losing balance/decreased step length/decreased sequencing ability/stand pivot/decreased upright posture, posterior retropulsion, significant posterior trunk lean/decreased balance during turns/decreased safety awareness

## 2017-01-17 NOTE — PHYSICAL THERAPY INITIAL EVALUATION ADULT - IMPAIRMENTS FOUND, PT EVAL
posture/muscle strength/gait, locomotion, and balance/poor safety awareness/aerobic capacity/endurance

## 2017-01-17 NOTE — PHYSICAL THERAPY INITIAL EVALUATION ADULT - LEVEL OF INDEPENDENCE: SIT/STAND, REHAB EVAL
x3 trials. bed to chair transfer stand pivot with RW with max A/maximum assist (25% patients effort)

## 2017-01-17 NOTE — SWALLOW VFSS/MBS ASSESSMENT ADULT - ORAL PHASE
Delayed oral transit time/Incomplete tongue to palate contact/Uncontrolled bolus / spillover in homero-pharynx/WILD ~6sec; mod spillover to the level of the valleculae Incomplete tongue to palate contact/mild mod spillover to the level of the aryepiglottic folds/Uncontrolled bolus / spillover in hypopharynx/Uncontrolled bolus / spillover in homero-pharynx Delayed oral transit time/Uncontrolled bolus / spillover in homero-pharynx/Uncontrolled bolus / spillover in hypopharynx/mod-severe spillover to the level of the valleculae with passive spillover to the level of the pyriform sinuses/Incomplete tongue to palate contact mod to the level of the valleculae; trace-mild spillover to the level of the pyriform sinuses/Incomplete tongue to palate contact/Uncontrolled bolus / spillover in homero-pharynx/Uncontrolled bolus / spillover in hypopharynx Uncontrolled bolus / spillover in homero-pharynx/Incomplete tongue to palate contact

## 2017-01-17 NOTE — SWALLOW VFSS/MBS ASSESSMENT ADULT - LARYNGEAL PENETRATION DURING THE SWALLOW - SILENT
without retrieval/Mild Mild/over the arytenoids, deep, without retrieval Moderate/Severe/deep, without complete retrieval Trace/Mild without complete retreival; laryngeal elevation maintained for secondary swallow with descent of material to the level of the cords and aspiration during secondary swallow/Moderate

## 2017-01-17 NOTE — PROVIDER CONTACT NOTE (OTHER) - RECOMMENDATIONS
UA C&S. NP notified.  Arrived to patient's bedside.
KONSTANTIN Russo aware. This to be discussed by family with attending MD Lopez. Family aware they are to d/w MD
KONSTANTIN haskins, will write orders accordingly
NP Elizabeth Ocasio aware, will follow up with urology
Pt family wishes to hold off on diet for now but will reconsider tomorrow
Renew order for indwelling catheter.

## 2017-01-17 NOTE — PHYSICAL THERAPY INITIAL EVALUATION ADULT - PLANNED THERAPY INTERVENTIONS, PT EVAL
transfer training/gait training/balance training/postural re-education/bed mobility training/strengthening

## 2017-01-17 NOTE — PHYSICAL THERAPY INITIAL EVALUATION ADULT - PERTINENT HX OF CURRENT PROBLEM, REHAB EVAL
89yoM, pmhx Afib, DM, chronic hunt, p/w developing productive cough 2-3 days PTA, from assisted living facility. was recently prescribed a nasal spray to help with mucous, but made symptoms worse and more difficult for him to cough up mucous. US chest right pneumonia, bilateral B-lines. US TTE Small Pericardial Effusion with no evidence of tamponade.

## 2017-01-17 NOTE — SWALLOW VFSS/MBS ASSESSMENT ADULT - SLP GENERAL OBSERVATIONS
Pt secure in GUIDO, A&Ox2, 3L of O2 via nasal cannula. Wet upper airway sounds improved from previous encounter.

## 2017-01-17 NOTE — SWALLOW VFSS/MBS ASSESSMENT ADULT - RECOMMENDED CONSISTENCY
NPO, with non-oral nutrition/hydration/medications if in keeping with patient and family wishes.   However, if patient and family wish to continue oral feeding, despite the recommendations of this service, would recommend Dysphagia 1 with nectar thickened fluids as a conservative option.

## 2017-01-17 NOTE — PROVIDER CONTACT NOTE (OTHER) - ASSESSMENT
Alert and responsive. [place].  Covering for RN.  Observed cloudy greenish urine in hunt catheter.  Patient [place]. No c/o burning sensation upon urination.

## 2017-01-17 NOTE — PHYSICAL THERAPY INITIAL EVALUATION ADULT - IMPAIRED TRANSFERS: SIT/STAND, REHAB EVAL
impaired balance/narrow base of support/decreased strength/impaired coordination/impaired postural control

## 2017-01-17 NOTE — PHYSICAL THERAPY INITIAL EVALUATION ADULT - PHYSICAL ASSIST/NONPHYSICAL ASSIST: GAIT, REHAB EVAL
nonverbal cues (demo/gestures)/verbal cues/max VC for sequencing, upright posture, BLE management, RW management,

## 2017-01-17 NOTE — SWALLOW VFSS/MBS ASSESSMENT ADULT - DIAGNOSTIC IMPRESSIONS
Pt presents with a severe pharyngeal dysphagia marked by diffuse post swallow pharyngeal residue, most markedly with thick puree, and laryngeal penetration, without retrieval, with all administered textures and aspiration with thin fluid. Pt was cued to cough throughout the exam to prevent aspiration of penetrated material; however, had pt not been cued to cough, suspect at least trace aspiration of all administered consistencies. Chewables were not administered 2/2 aspiration noted on exam. A chin tuck was not successful in maintaining airway protection.

## 2017-01-18 ENCOUNTER — TRANSCRIPTION ENCOUNTER (OUTPATIENT)
Age: 82
End: 2017-01-18

## 2017-01-18 LAB
BLD GP AB SCN SERPL QL: POSITIVE — SIGNIFICANT CHANGE UP
HCT VFR BLD CALC: 33.8 % — LOW (ref 39–50)
HGB BLD-MCNC: 10.9 G/DL — LOW (ref 13–17)
MCHC RBC-ENTMCNC: 29 PG — SIGNIFICANT CHANGE UP (ref 27–34)
MCHC RBC-ENTMCNC: 32.3 GM/DL — SIGNIFICANT CHANGE UP (ref 32–36)
MCV RBC AUTO: 89.8 FL — SIGNIFICANT CHANGE UP (ref 80–100)
PLATELET # BLD AUTO: 333 K/UL — SIGNIFICANT CHANGE UP (ref 150–400)
RBC # BLD: 3.77 M/UL — LOW (ref 4.2–5.8)
RBC # FLD: 13.7 % — SIGNIFICANT CHANGE UP (ref 10.3–14.5)
RH IG SCN BLD-IMP: POSITIVE — SIGNIFICANT CHANGE UP
WBC # BLD: 13.3 K/UL — HIGH (ref 3.8–10.5)
WBC # FLD AUTO: 13.3 K/UL — HIGH (ref 3.8–10.5)

## 2017-01-18 PROCEDURE — 86077 PHYS BLOOD BANK SERV XMATCH: CPT

## 2017-01-18 RX ORDER — INSULIN LISPRO 100/ML
VIAL (ML) SUBCUTANEOUS AT BEDTIME
Qty: 0 | Refills: 0 | Status: DISCONTINUED | OUTPATIENT
Start: 2017-01-18 | End: 2017-01-20

## 2017-01-18 RX ORDER — TAMSULOSIN HYDROCHLORIDE 0.4 MG/1
1 CAPSULE ORAL
Qty: 0 | Refills: 0 | COMMUNITY

## 2017-01-18 RX ORDER — CEFUROXIME AXETIL 250 MG
250 TABLET ORAL EVERY 12 HOURS
Qty: 0 | Refills: 0 | Status: DISCONTINUED | OUTPATIENT
Start: 2017-01-18 | End: 2017-01-20

## 2017-01-18 RX ORDER — INSULIN GLARGINE 100 [IU]/ML
12 INJECTION, SOLUTION SUBCUTANEOUS
Qty: 0 | Refills: 0 | COMMUNITY

## 2017-01-18 RX ORDER — INSULIN LISPRO 100/ML
VIAL (ML) SUBCUTANEOUS
Qty: 0 | Refills: 0 | Status: DISCONTINUED | OUTPATIENT
Start: 2017-01-18 | End: 2017-01-20

## 2017-01-18 RX ORDER — ASPIRIN/CALCIUM CARB/MAGNESIUM 324 MG
1 TABLET ORAL
Qty: 0 | Refills: 0 | COMMUNITY

## 2017-01-18 RX ORDER — INSULIN GLARGINE 100 [IU]/ML
10 INJECTION, SOLUTION SUBCUTANEOUS AT BEDTIME
Qty: 0 | Refills: 0 | Status: DISCONTINUED | OUTPATIENT
Start: 2017-01-18 | End: 2017-01-20

## 2017-01-18 RX ADMIN — Medication 100 MILLIGRAM(S): at 17:37

## 2017-01-18 RX ADMIN — Medication 3 MILLILITER(S): at 06:07

## 2017-01-18 RX ADMIN — Medication 250 MILLIGRAM(S): at 17:36

## 2017-01-18 RX ADMIN — INSULIN GLARGINE 10 UNIT(S): 100 INJECTION, SOLUTION SUBCUTANEOUS at 22:35

## 2017-01-18 RX ADMIN — Medication 500 MILLIGRAM(S): at 12:05

## 2017-01-18 RX ADMIN — Medication 3 MILLILITER(S): at 12:05

## 2017-01-18 RX ADMIN — Medication 100 MILLIGRAM(S): at 06:07

## 2017-01-18 RX ADMIN — Medication 100 MICROGRAM(S): at 06:07

## 2017-01-18 RX ADMIN — Medication 500 MICROGRAM(S): at 06:07

## 2017-01-18 RX ADMIN — Medication 4 MILLIGRAM(S): at 22:35

## 2017-01-18 RX ADMIN — TAMSULOSIN HYDROCHLORIDE 0.4 MILLIGRAM(S): 0.4 CAPSULE ORAL at 17:37

## 2017-01-18 RX ADMIN — LATANOPROST 1 DROP(S): 0.05 SOLUTION/ DROPS OPHTHALMIC; TOPICAL at 22:35

## 2017-01-18 RX ADMIN — SERTRALINE 50 MILLIGRAM(S): 25 TABLET, FILM COATED ORAL at 12:05

## 2017-01-18 RX ADMIN — HEPARIN SODIUM 5000 UNIT(S): 5000 INJECTION INTRAVENOUS; SUBCUTANEOUS at 14:58

## 2017-01-18 RX ADMIN — Medication 3 MILLILITER(S): at 17:37

## 2017-01-18 RX ADMIN — Medication 2: at 12:10

## 2017-01-18 RX ADMIN — Medication 2: at 18:05

## 2017-01-18 RX ADMIN — Medication 1: at 06:13

## 2017-01-18 RX ADMIN — HEPARIN SODIUM 5000 UNIT(S): 5000 INJECTION INTRAVENOUS; SUBCUTANEOUS at 06:07

## 2017-01-18 RX ADMIN — Medication 1: at 00:04

## 2017-01-18 RX ADMIN — TAMSULOSIN HYDROCHLORIDE 0.4 MILLIGRAM(S): 0.4 CAPSULE ORAL at 06:07

## 2017-01-18 RX ADMIN — Medication 500 MICROGRAM(S): at 12:06

## 2017-01-18 RX ADMIN — Medication 81 MILLIGRAM(S): at 12:05

## 2017-01-18 RX ADMIN — CEFTRIAXONE 100 GRAM(S): 500 INJECTION, POWDER, FOR SOLUTION INTRAMUSCULAR; INTRAVENOUS at 09:45

## 2017-01-18 RX ADMIN — Medication 500 MICROGRAM(S): at 17:37

## 2017-01-18 RX ADMIN — Medication 1 TABLET(S): at 12:05

## 2017-01-18 NOTE — PROVIDER CONTACT NOTE (OTHER) - SITUATION
Pt with chronic hunt, replaced today 1/18, hematuria noted, STAT CBC ordered. Pt h&h stable 10.9/33.8

## 2017-01-18 NOTE — DISCHARGE NOTE ADULT - HOSPITAL COURSE
to be completed by attending md 89 year old male with PMHx of atrial fibrillation on aspirin (not on anticoagulation due to history of RP bleeding), T2DM, CKD stage III, BPH with chronic indwelling Hunt, hypothyroidism, depression presenting from assisted living for evaluation of cough. The patient states that 2-3 days ago, he developed a cough. The cough is associated with sputum production, but it is difficult for him to expectorate. When he does, the sputum is dark in color. He also reports subjective dyspnea on exertion. He denies fever, chills, nausea, or vomiting, but does report a few loose stools over the past 24 hours. No recent antibiotic exposure. Was hospitalized here approximately one month ago. Denies sick contacts. He was recently prescribed a nasal spray to help with his mucous, but made symptoms worse and more difficult for him to cough up mucous. This has been associated with a slight change in his voice as well.  1/14	admitted with PNA- vibramycin and rocephin   1/15	tamiflu renally dosed to q 48 hrs  1/16: had swallow eval- npo recomm- mbs maximiliano        family wants trial of void-   1/18	seen by urology- hunt replaced-  	hematuria- house urology aware  	cbc f/u  	off sq heparin  	added SCD for DVt prophylaxis  	changed to po ceftin for 1 more dayy  	cont doxycycline and tamiflu  1/19	stable H/H; no hematuria  	DNR  	dc plan for tomorrow if home care arrangement/ bed/equipment   1/20	clarified annitbiotics- completed; tamiflu to complete 2 more doses  	for dc to home with home care/ home PT- once electric bed is arranged  pt was admitted and treated for pna/flu likely aspiration pna.  hospital course complicated by urinary retention.  pt stable for dc.

## 2017-01-18 NOTE — DISCHARGE NOTE ADULT - PATIENT PORTAL LINK FT
“You can access the [facility] Patient Portal, offered by [facility], by registering with the following website: [email or web address]”

## 2017-01-18 NOTE — PROVIDER CONTACT NOTE (OTHER) - ACTION/TREATMENT ORDERED:
Ordered Urinalysis.  Monitor patient for any changes.
BRICE Erickson aware, will write orders accordingly. Will continue to monitor.
KONSTANTIN Russo d/w family thoroughly risks of eating w/o s&s being done. Family and Pt want diet. KONSTANTIN Russo to write diet. Will continue to monitor.
KONSTANTIN Russo further explained above to Pt family. Will continue to monitor.
NP Elizabeth Ocasio aware. No further orders at this time. Will continue to monitor Pt
NP Nakia Carbajal aware. Urology to assess Pt. Will f/u with lab results. Will continue to monitor Pt
Will continue to monitor Pt
Will remain available. Will continue to monitor Pt. No further orders at  this time
No diet to be ordered at this time. Will continue to monitor
Order for indwelling catheter renewed.

## 2017-01-18 NOTE — DISCHARGE NOTE ADULT - HOME CARE AGENCY
[facility] [phone] Start of Care 1/21 Services requested: RN, Physical therapy & sppech. RN will call to set up an appointment.

## 2017-01-18 NOTE — DISCHARGE NOTE ADULT - MEDICATION SUMMARY - MEDICATIONS TO TAKE
I will START or STAY ON the medications listed below when I get home from the hospital:    aspirin 325 mg oral delayed release tablet  -- 1 tab(s) by mouth once a day  -- Indication: For Antiplatelet    Flomax 0.4 mg oral capsule  -- 1 cap(s) by mouth 2 times a day  -- Indication: For BPH (benign prostatic hyperplasia)    terazosin 5 mg oral tablet  -- 1 milligram(s) by mouth once a day  -- Indication: For BPH (benign prostatic hyperplasia)    sertraline 50 mg oral tablet  -- 1 tab(s) by mouth once a day  -- Indication: For mood    Lantus 100 units/mL subcutaneous solution  -- 12 unit(s) subcutaneous once a day  -- Indication: For Type 2 diabetes mellitus with stage 3 chronic kidney disease, with long-term current use of insulin    oseltamivir 30 mg oral capsule  -- 1 cap(s) by mouth every other day   -- Check with your doctor before becoming pregnant.  Finish all this medication unless otherwise directed by prescriber.    -- Indication: For Prophylaxis  for flu -exposure     senna oral tablet  -- 2 tab(s) by mouth 2 times a day  -- Indication: For laxative    docusate sodium 100 mg oral capsule  -- 1 cap(s) by mouth 3 times a day  -- Indication: For Stool softner    Xalatan 0.005% ophthalmic solution  -- 1 drop(s) to each affected eye once a day (at bedtime) both eyes  -- Indication: For glacoma    Synthroid 100 mcg (0.1 mg) oral tablet  -- 1 tab(s) by mouth once a day  -- Indication: For Hypothyroidism, unspecified type    multivitamin  -- 1 tab(s) by mouth once a day  -- Indication: For vitamin    Vitamin C 500 mg oral tablet  -- 1 tab(s) by mouth once a day  -- Indication: For vitamin I will START or STAY ON the medications listed below when I get home from the hospital:    aspirin 325 mg oral delayed release tablet  -- 1 tab(s) by mouth once a day  -- Indication: For Antiplatelet    Flomax 0.4 mg oral capsule  -- 1 cap(s) by mouth 2 times a day  -- Indication: For BPH (benign prostatic hyperplasia)    terazosin 5 mg oral tablet  -- 1 milligram(s) by mouth once a day  -- Indication: For BPH (benign prostatic hyperplasia)    sertraline 50 mg oral tablet  -- 1 tab(s) by mouth once a day  -- Indication: For mood    Lantus 100 units/mL subcutaneous solution  -- 12 unit(s) subcutaneous once a day  -- Indication: For Type 2 diabetes mellitus with stage 3 chronic kidney disease, with long-term current use of insulin    oseltamivir 30 mg oral capsule  -- 1 cap(s) by mouth every other day   -- Check with your doctor before becoming pregnant.  Finish all this medication unless otherwise directed by prescriber.    -- Indication: For Prophylaxis  for flu -exposure     Proventil HFA 90 mcg/inh inhalation aerosol  -- 2 puff(s) inhaled 4 times a day as needed for wheezing  -- For inhalation only.  It is very important that you take or use this exactly as directed.  Do not skip doses or discontinue unless directed by your doctor.  Obtain medical advice before taking any non-prescription drugs as some may affect the action of this medication.  Shake well before use.    -- Indication: For Bronchodilator    senna oral tablet  -- 2 tab(s) by mouth 2 times a day  -- Indication: For laxative    docusate sodium 100 mg oral capsule  -- 1 cap(s) by mouth 3 times a day  -- Indication: For Stool softner    Xalatan 0.005% ophthalmic solution  -- 1 drop(s) to each affected eye once a day (at bedtime) both eyes  -- Indication: For glacoma    Synthroid 100 mcg (0.1 mg) oral tablet  -- 1 tab(s) by mouth once a day  -- Indication: For Hypothyroidism, unspecified type    multivitamin  -- 1 tab(s) by mouth once a day  -- Indication: For vitamin    Vitamin C 500 mg oral tablet  -- 1 tab(s) by mouth once a day  -- Indication: For vitamin

## 2017-01-18 NOTE — PROVIDER CONTACT NOTE (OTHER) - DATE AND TIME:
15-Jovanny-2017 08:44
15-Jovanny-2017 12:07
15-Jovanny-2017 13:04
15-Jovanny-2017 13:14
15-Jovanny-2017 15:13
16-Jan-2017 18:18
17-Jan-2017 00:14
18-Jan-2017 16:45
18-Jan-2017 18:29
17-Jan-2017 04:11

## 2017-01-18 NOTE — DISCHARGE NOTE ADULT - INSTRUCTIONS
dysphagia 1 with honey thick liquid  aspiration precaution dysphagia 1 with nectar  thick liquid; glucerna 1 can daily  aspiration precaution

## 2017-01-18 NOTE — DISCHARGE NOTE ADULT - CARE PROVIDERS DIRECT ADDRESSES
,[email or web address],DirectAddress_Unknown [email or web address],DirectAddress_Unknown,DirectAddress_Unknown

## 2017-01-18 NOTE — DISCHARGE NOTE ADULT - NSFTFATTENDCERTRD_GEN_ALL_CORE
I, the Attending Physician, certify the above stated patient is homebound and upon completion of the Face-To-Face encounter, has the need for intermittent skilled nursing, physical therapy and/or speech or occupational therapy services in their home for their current diagnosis as outlined in their initiial plan of care. These services will continue to be monitored by myself or another physician

## 2017-01-18 NOTE — DISCHARGE NOTE ADULT - CARE PROVIDER_API CALL
Jaziel Grigsby), Urology  [address] [place] [address]  Phone: [phone]  Fax: [phone] Jaziel Grigsby), Urology  [address] [place] [address]  Phone: [phone]  Fax: [phone]    Elijah Sosa (MD), Cardiovascular Disease; Internal Medicine  [address] [address]  Phone: [phone]  Fax: [phone]

## 2017-01-18 NOTE — DISCHARGE NOTE ADULT - SECONDARY DIAGNOSIS.
Dysphagia Type 2 diabetes mellitus with stage 3 chronic kidney disease, with long-term current use of insulin Hypothyroidism, unspecified type BPH (benign prostatic hyperplasia)

## 2017-01-18 NOTE — DISCHARGE NOTE ADULT - PLAN OF CARE
aspiration pneumonia likely secondary to dysphagia/ aspiration risk  diet as tolerated as prescribed with aspiration risk improves dysphagia diet as prescribed as per your choice  aspiration precaution while feeding HgA1C this admission.6.6  Make sure you get your HgA1c checked every three months.  If you take oral diabetes medications, check your blood glucose two times a day.  If you take insulin, check your blood glucose before meals and at bedtime.  It's important not to skip any meals.  Keep a log of your blood glucose results and always take it with you to your doctor appointments.  Keep a list of your current medications including injectables and over the counter medications and bring this medication list with you to all your doctor appointments.  If you have not seen your opthalmologist this year call for appointment.  Check your feet daily for redness, sores, or openings. Do not self treat. If no improvement in two days call your primary care physician for an appointment.  Low blood sugar (hypoglycemia) is a blood sugar below 70mg/dl. Check your blood sugar if you feel signs/symptoms of hypoglycemia. If your blood sugar is below 70 take 15 grams of carbohydrates (ex 4 oz of apple juice, 3-4 glucosr tablets, or 4-6 oz of regular soda) wait 15 minutes and repeat blood sugar to make sure it comes up above 70.  If your blood sugar is above 70 and you are due for a meal, have a meal.  If you are not due for a meal have a snack.  This snack helps keeps your blood sugar at a safe range. continue your medication continue hunt catheter for urinary retention  you were seen by urologist- hunt catheter changed 1/18/17  continue medication  follow up with urologist as outpatient aspiration pneumonia likely secondary to dysphagia/ aspiration risk  completed antibiotic  diet as tolerated as prescribed with aspiration risk continue hunt catheter for urinary retention  you were seen by urologist- hunt catheter changed 1/18/17 and follow up with urologist as outpatient  continue medication  follow up with urologist as outpatient HgA1C this admission.6.6  Make sure you get your HgA1c checked every three months.  check your blood glucose before meals and at bedtime. while you are on insulin and follow up with your doctor to adjust dose as per finger stick  It's important not to skip any meals.  Keep a log of your blood glucose results and always take it with you to your doctor appointments.  Keep a list of your current medications including injectables and over the counter medications and bring this medication list with you to all your doctor appointments.  If you have not seen your opthalmologist this year call for appointment.  Check your feet daily for redness, sores, or openings. Do not self treat. If no improvement in two days call your primary care physician for an appointment.  Low blood sugar (hypoglycemia) is a blood sugar below 70mg/dl. Check your blood sugar if you feel signs/symptoms of hypoglycemia. If your blood sugar is below 70 take 15 grams of carbohydrates (ex 4 oz of apple juice, 3-4 glucosr tablets, or 4-6 oz of regular soda) wait 15 minutes and repeat blood sugar to make sure it comes up above 70.  If your blood sugar is above 70 and you are due for a meal, have a meal.  If you are not due for a meal have a snack.  This snack helps keeps your blood sugar at a safe range.  follow up with your doctor as per our discussion that you will make appointment

## 2017-01-18 NOTE — PROVIDER CONTACT NOTE (OTHER) - REASON
Pt family asking for diet order
Pt family requesting Pt be able to eat
Pt family requesting TOV for Pt
Pt family requesting pulmonary consult
Pt with hematuria in hunt
RN asking for FS order q6 and hunt order for chronic hunt
RN notifying NP of Pt h&h
RN requesting new sliding scale as Pt has diet
Renew indwelling catheter order
Cloudy - Greenish urine

## 2017-01-18 NOTE — PROVIDER CONTACT NOTE (OTHER) - NAME OF MD/NP/PA/DO NOTIFIED:
KONSTANTIN Carbajal
KONSTANTIN Erickson
KONSTANTIN Ocasio
KONSTANTIN Ocasio
KONSTANTIN Russo
Lizzy Paniagua, NP
Lizzy Paniagua, NP

## 2017-01-18 NOTE — DISCHARGE NOTE ADULT - NSFTFSERV1RD_GEN_ALL_CORE
observation and assessment/rehabilitation services/teaching and training/medication teaching and assessment

## 2017-01-18 NOTE — DISCHARGE NOTE ADULT - CARE PLAN
Principal Discharge DX:	Pneumonia  Goal:	improves  Instructions for follow-up, activity and diet:	aspiration pneumonia likely secondary to dysphagia/ aspiration risk  diet as tolerated as prescribed with aspiration risk  Secondary Diagnosis:	Dysphagia  Instructions for follow-up, activity and diet:	dysphagia diet as prescribed as per your choice  aspiration precaution while feeding  Secondary Diagnosis:	Type 2 diabetes mellitus with stage 3 chronic kidney disease, with long-term current use of insulin  Instructions for follow-up, activity and diet:	HgA1C this admission.6.6  Make sure you get your HgA1c checked every three months.  If you take oral diabetes medications, check your blood glucose two times a day.  If you take insulin, check your blood glucose before meals and at bedtime.  It's important not to skip any meals.  Keep a log of your blood glucose results and always take it with you to your doctor appointments.  Keep a list of your current medications including injectables and over the counter medications and bring this medication list with you to all your doctor appointments.  If you have not seen your opthalmologist this year call for appointment.  Check your feet daily for redness, sores, or openings. Do not self treat. If no improvement in two days call your primary care physician for an appointment.  Low blood sugar (hypoglycemia) is a blood sugar below 70mg/dl. Check your blood sugar if you feel signs/symptoms of hypoglycemia. If your blood sugar is below 70 take 15 grams of carbohydrates (ex 4 oz of apple juice, 3-4 glucosr tablets, or 4-6 oz of regular soda) wait 15 minutes and repeat blood sugar to make sure it comes up above 70.  If your blood sugar is above 70 and you are due for a meal, have a meal.  If you are not due for a meal have a snack.  This snack helps keeps your blood sugar at a safe range.  Secondary Diagnosis:	Hypothyroidism, unspecified type  Instructions for follow-up, activity and diet:	continue your medication  Secondary Diagnosis:	BPH (benign prostatic hyperplasia)  Instructions for follow-up, activity and diet:	continue hunt catheter for urinary retention  you were seen by urologist- hunt catheter changed 1/18/17  continue medication  follow up with urologist as outpatient Principal Discharge DX:	Pneumonia  Goal:	improves  Instructions for follow-up, activity and diet:	aspiration pneumonia likely secondary to dysphagia/ aspiration risk  completed antibiotic  diet as tolerated as prescribed with aspiration risk  Secondary Diagnosis:	Dysphagia  Instructions for follow-up, activity and diet:	dysphagia diet as prescribed as per your choice  aspiration precaution while feeding  Secondary Diagnosis:	Type 2 diabetes mellitus with stage 3 chronic kidney disease, with long-term current use of insulin  Instructions for follow-up, activity and diet:	HgA1C this admission.6.6  Make sure you get your HgA1c checked every three months.  If you take oral diabetes medications, check your blood glucose two times a day.  If you take insulin, check your blood glucose before meals and at bedtime.  It's important not to skip any meals.  Keep a log of your blood glucose results and always take it with you to your doctor appointments.  Keep a list of your current medications including injectables and over the counter medications and bring this medication list with you to all your doctor appointments.  If you have not seen your opthalmologist this year call for appointment.  Check your feet daily for redness, sores, or openings. Do not self treat. If no improvement in two days call your primary care physician for an appointment.  Low blood sugar (hypoglycemia) is a blood sugar below 70mg/dl. Check your blood sugar if you feel signs/symptoms of hypoglycemia. If your blood sugar is below 70 take 15 grams of carbohydrates (ex 4 oz of apple juice, 3-4 glucosr tablets, or 4-6 oz of regular soda) wait 15 minutes and repeat blood sugar to make sure it comes up above 70.  If your blood sugar is above 70 and you are due for a meal, have a meal.  If you are not due for a meal have a snack.  This snack helps keeps your blood sugar at a safe range.  Secondary Diagnosis:	Hypothyroidism, unspecified type  Instructions for follow-up, activity and diet:	continue your medication  Secondary Diagnosis:	BPH (benign prostatic hyperplasia)  Instructions for follow-up, activity and diet:	continue hunt catheter for urinary retention  you were seen by urologist- hunt catheter changed 1/18/17 and follow up with urologist as outpatient  continue medication  follow up with urologist as outpatient Principal Discharge DX:	Pneumonia  Goal:	improves  Instructions for follow-up, activity and diet:	aspiration pneumonia likely secondary to dysphagia/ aspiration risk  completed antibiotic  diet as tolerated as prescribed with aspiration risk  Secondary Diagnosis:	Dysphagia  Instructions for follow-up, activity and diet:	dysphagia diet as prescribed as per your choice  aspiration precaution while feeding  Secondary Diagnosis:	Type 2 diabetes mellitus with stage 3 chronic kidney disease, with long-term current use of insulin  Instructions for follow-up, activity and diet:	HgA1C this admission.6.6  Make sure you get your HgA1c checked every three months.  check your blood glucose before meals and at bedtime. while you are on insulin and follow up with your doctor to adjust dose as per finger stick  It's important not to skip any meals.  Keep a log of your blood glucose results and always take it with you to your doctor appointments.  Keep a list of your current medications including injectables and over the counter medications and bring this medication list with you to all your doctor appointments.  If you have not seen your opthalmologist this year call for appointment.  Check your feet daily for redness, sores, or openings. Do not self treat. If no improvement in two days call your primary care physician for an appointment.  Low blood sugar (hypoglycemia) is a blood sugar below 70mg/dl. Check your blood sugar if you feel signs/symptoms of hypoglycemia. If your blood sugar is below 70 take 15 grams of carbohydrates (ex 4 oz of apple juice, 3-4 glucosr tablets, or 4-6 oz of regular soda) wait 15 minutes and repeat blood sugar to make sure it comes up above 70.  If your blood sugar is above 70 and you are due for a meal, have a meal.  If you are not due for a meal have a snack.  This snack helps keeps your blood sugar at a safe range.  follow up with your doctor as per our discussion that you will make appointment  Secondary Diagnosis:	Hypothyroidism, unspecified type  Instructions for follow-up, activity and diet:	continue your medication  Secondary Diagnosis:	BPH (benign prostatic hyperplasia)  Instructions for follow-up, activity and diet:	continue hunt catheter for urinary retention  you were seen by urologist- hunt catheter changed 1/18/17 and follow up with urologist as outpatient  continue medication  follow up with urologist as outpatient

## 2017-01-19 ENCOUNTER — APPOINTMENT (OUTPATIENT)
Dept: UROLOGY | Facility: CLINIC | Age: 82
End: 2017-01-19

## 2017-01-19 LAB
ANION GAP SERPL CALC-SCNC: 18 MMOL/L — HIGH (ref 5–17)
BUN SERPL-MCNC: 61 MG/DL — HIGH (ref 7–23)
CALCIUM SERPL-MCNC: 9.7 MG/DL — SIGNIFICANT CHANGE UP (ref 8.4–10.5)
CHLORIDE SERPL-SCNC: 108 MMOL/L — SIGNIFICANT CHANGE UP (ref 96–108)
CO2 SERPL-SCNC: 17 MMOL/L — LOW (ref 22–31)
CREAT SERPL-MCNC: 1.52 MG/DL — HIGH (ref 0.5–1.3)
CULTURE RESULTS: SIGNIFICANT CHANGE UP
CULTURE RESULTS: SIGNIFICANT CHANGE UP
GLUCOSE SERPL-MCNC: 170 MG/DL — HIGH (ref 70–99)
HCT VFR BLD CALC: 33.4 % — LOW (ref 39–50)
HGB BLD-MCNC: 10.5 G/DL — LOW (ref 13–17)
MCHC RBC-ENTMCNC: 28.6 PG — SIGNIFICANT CHANGE UP (ref 27–34)
MCHC RBC-ENTMCNC: 31.4 GM/DL — LOW (ref 32–36)
MCV RBC AUTO: 91 FL — SIGNIFICANT CHANGE UP (ref 80–100)
PLATELET # BLD AUTO: 288 K/UL — SIGNIFICANT CHANGE UP (ref 150–400)
POTASSIUM SERPL-MCNC: 4.8 MMOL/L — SIGNIFICANT CHANGE UP (ref 3.5–5.3)
POTASSIUM SERPL-SCNC: 4.8 MMOL/L — SIGNIFICANT CHANGE UP (ref 3.5–5.3)
RBC # BLD: 3.67 M/UL — LOW (ref 4.2–5.8)
RBC # FLD: 16.4 % — HIGH (ref 10.3–14.5)
SODIUM SERPL-SCNC: 143 MMOL/L — SIGNIFICANT CHANGE UP (ref 135–145)
SPECIMEN SOURCE: SIGNIFICANT CHANGE UP
SPECIMEN SOURCE: SIGNIFICANT CHANGE UP
WBC # BLD: 14.19 K/UL — HIGH (ref 3.8–10.5)
WBC # FLD AUTO: 14.19 K/UL — HIGH (ref 3.8–10.5)

## 2017-01-19 RX ORDER — LIDOCAINE 4 G/100G
1 CREAM TOPICAL THREE TIMES A DAY
Qty: 0 | Refills: 0 | Status: DISCONTINUED | OUTPATIENT
Start: 2017-01-19 | End: 2017-01-20

## 2017-01-19 RX ADMIN — Medication 500 MILLIGRAM(S): at 12:58

## 2017-01-19 RX ADMIN — Medication 1: at 18:08

## 2017-01-19 RX ADMIN — Medication 1 TABLET(S): at 12:59

## 2017-01-19 RX ADMIN — Medication 3 MILLILITER(S): at 01:23

## 2017-01-19 RX ADMIN — Medication 30 MILLIGRAM(S): at 22:18

## 2017-01-19 RX ADMIN — Medication 1: at 08:58

## 2017-01-19 RX ADMIN — Medication 500 MICROGRAM(S): at 17:27

## 2017-01-19 RX ADMIN — LATANOPROST 1 DROP(S): 0.05 SOLUTION/ DROPS OPHTHALMIC; TOPICAL at 22:18

## 2017-01-19 RX ADMIN — SERTRALINE 50 MILLIGRAM(S): 25 TABLET, FILM COATED ORAL at 12:59

## 2017-01-19 RX ADMIN — Medication 3 MILLILITER(S): at 06:02

## 2017-01-19 RX ADMIN — Medication 3 MILLILITER(S): at 12:59

## 2017-01-19 RX ADMIN — Medication 100 MICROGRAM(S): at 06:02

## 2017-01-19 RX ADMIN — Medication 250 MILLIGRAM(S): at 17:27

## 2017-01-19 RX ADMIN — TAMSULOSIN HYDROCHLORIDE 0.4 MILLIGRAM(S): 0.4 CAPSULE ORAL at 06:02

## 2017-01-19 RX ADMIN — Medication 500 MICROGRAM(S): at 06:02

## 2017-01-19 RX ADMIN — Medication 81 MILLIGRAM(S): at 12:59

## 2017-01-19 RX ADMIN — Medication 4 MILLIGRAM(S): at 22:18

## 2017-01-19 RX ADMIN — Medication 100 MILLIGRAM(S): at 06:02

## 2017-01-19 RX ADMIN — Medication 3 MILLILITER(S): at 17:27

## 2017-01-19 RX ADMIN — Medication 2: at 12:58

## 2017-01-19 RX ADMIN — Medication 100 MILLIGRAM(S): at 17:27

## 2017-01-19 RX ADMIN — Medication 500 MICROGRAM(S): at 01:24

## 2017-01-19 RX ADMIN — Medication 250 MILLIGRAM(S): at 06:02

## 2017-01-19 RX ADMIN — INSULIN GLARGINE 10 UNIT(S): 100 INJECTION, SOLUTION SUBCUTANEOUS at 22:18

## 2017-01-19 RX ADMIN — TAMSULOSIN HYDROCHLORIDE 0.4 MILLIGRAM(S): 0.4 CAPSULE ORAL at 17:27

## 2017-01-19 RX ADMIN — Medication 500 MICROGRAM(S): at 12:59

## 2017-01-19 NOTE — DIETITIAN INITIAL EVALUATION ADULT. - ORAL INTAKE PTA
good/Aide reports patient had an excellent appetite PTA and would complete 100% of his meals.   She described patient as a "fast eater".

## 2017-01-19 NOTE — DIETITIAN INITIAL EVALUATION ADULT. - PERTINENT LABORATORY DATA
01/19/17: K+ - 4.8, glucose - 170;  last HgbA1c - 6.6 (12/01/16).   F/S  01/19/17: 183, 212;  01/18/17: 127-206;  01/17/17: 152-232;  01/16/17: 145-195

## 2017-01-19 NOTE — DIETITIAN INITIAL EVALUATION ADULT. - PROBLEM SELECTOR PLAN 1
1. Admit to medicine.  2. ID consulted, recommending to stop vanco/cefepime/azithromycin and start ceftriaxone 1 g IV q24h and doxycycline 100 mg PO daily.  3. F/U blood cultures.  4. Check urine for legionella.  5. Guaifenesin ATC  6. Chest PT for secretions.  7. Aspiration precautions.

## 2017-01-19 NOTE — DIETITIAN INITIAL EVALUATION ADULT. - ENERGY NEEDS
Height:  5' 6"     Weight: 128.3 lbs  BMI:  20.7 kg/m2    IBW: 142 lbs  (+/- 10%)    % IBW: 90 %          Edema:  none noted       Skin: no pressure ulcers   BM:  last BM 01/16/17  Other pertinent information:  Patient presented with cough and was admitted with community acquired  pneumonia.  Noted likely aspiration pneumonia.  PMH includes A Fib, DM2, CKD III.  Patient had VFSS/MBS on 01/17/17 and was recommended to be NPO.  Further recommendation should family and patient wish po diet be provided for a dysphagia I with nectar-consistency thickened liquids.

## 2017-01-19 NOTE — DIETITIAN INITIAL EVALUATION ADULT. - OTHER INFO
Nutrition consult received for nutrition assessment.  Patient's po intakes have decreased with change in diet to dysphagia 1 with honey consistency fluids.  Patient had been eating a regular consistency diet PTA and reported not to have any difficulty chewing or swallowing.  He was drinking one serving of Glucerna on most days.  REPorted his UBW had been 135 lbs, however, unsure of recent weight.  Blood glucose monitoring every morning PTA with levels reported to be 100 to over 200.  He was taking lantus insulin.  Multivitamin and Vitamin C taken PTA.  Confirmed NKFA. Nutrition consult received for nutrition assessment.  Patient's po intakes have decreased with change in diet to dysphagia 1 with honey consistency fluids.  Patient had been eating a regular consistency diet PTA and reported not to have any history of difficulty chewing or swallowing.  He would consume oatmeal or farina and 2 eggs with coffee at breakfast; ham and cheese sandwich at lunch and a "full 3 course dinner".   He was drinking one serving of Glucerna on most days.  Reported his UBW had been 135 lbs, however, unsure of recent weights.  Blood glucose monitoring every morning PTA with levels reported to be 100 to over 200.  He was taking lantus insulin.  Multivitamin and Vitamin C taken PTA.  Confirmed NKFA.

## 2017-01-19 NOTE — DIETITIAN INITIAL EVALUATION ADULT. - ADHERENCE
good/Patient followed a diabetic diet PTA, reported he had sugar-free snacks that his daughter would purchase for him.  Patient would allow himself to have a Italian or donut on occasion when his glucose levels were not high in the morning.

## 2017-01-20 VITALS
SYSTOLIC BLOOD PRESSURE: 127 MMHG | RESPIRATION RATE: 18 BRPM | OXYGEN SATURATION: 97 % | TEMPERATURE: 98 F | HEART RATE: 83 BPM | DIASTOLIC BLOOD PRESSURE: 78 MMHG

## 2017-01-20 LAB
ANION GAP SERPL CALC-SCNC: 17 MMOL/L — SIGNIFICANT CHANGE UP (ref 5–17)
BASOPHILS # BLD AUTO: 0 K/UL — SIGNIFICANT CHANGE UP (ref 0–0.2)
BASOPHILS NFR BLD AUTO: 0.3 % — SIGNIFICANT CHANGE UP (ref 0–2)
BUN SERPL-MCNC: 65 MG/DL — HIGH (ref 7–23)
CALCIUM SERPL-MCNC: 9.3 MG/DL — SIGNIFICANT CHANGE UP (ref 8.4–10.5)
CHLORIDE SERPL-SCNC: 105 MMOL/L — SIGNIFICANT CHANGE UP (ref 96–108)
CO2 SERPL-SCNC: 21 MMOL/L — LOW (ref 22–31)
CREAT SERPL-MCNC: 1.63 MG/DL — HIGH (ref 0.5–1.3)
EOSINOPHIL # BLD AUTO: 0.4 K/UL — SIGNIFICANT CHANGE UP (ref 0–0.5)
EOSINOPHIL NFR BLD AUTO: 2.8 % — SIGNIFICANT CHANGE UP (ref 0–6)
GLUCOSE SERPL-MCNC: 219 MG/DL — HIGH (ref 70–99)
HCT VFR BLD CALC: 28.7 % — LOW (ref 39–50)
HGB BLD-MCNC: 9.6 G/DL — LOW (ref 13–17)
LYMPHOCYTES # BLD AUTO: 1.8 K/UL — SIGNIFICANT CHANGE UP (ref 1–3.3)
LYMPHOCYTES # BLD AUTO: 13.8 % — SIGNIFICANT CHANGE UP (ref 13–44)
MCHC RBC-ENTMCNC: 30.2 PG — SIGNIFICANT CHANGE UP (ref 27–34)
MCHC RBC-ENTMCNC: 33.4 GM/DL — SIGNIFICANT CHANGE UP (ref 32–36)
MCV RBC AUTO: 90.4 FL — SIGNIFICANT CHANGE UP (ref 80–100)
MONOCYTES # BLD AUTO: 0.8 K/UL — SIGNIFICANT CHANGE UP (ref 0–0.9)
MONOCYTES NFR BLD AUTO: 5.8 % — SIGNIFICANT CHANGE UP (ref 2–14)
NEUTROPHILS # BLD AUTO: 10.1 K/UL — HIGH (ref 1.8–7.4)
NEUTROPHILS NFR BLD AUTO: 77.3 % — HIGH (ref 43–77)
PLATELET # BLD AUTO: 265 K/UL — SIGNIFICANT CHANGE UP (ref 150–400)
POTASSIUM SERPL-MCNC: 4.8 MMOL/L — SIGNIFICANT CHANGE UP (ref 3.5–5.3)
POTASSIUM SERPL-SCNC: 4.8 MMOL/L — SIGNIFICANT CHANGE UP (ref 3.5–5.3)
RBC # BLD: 3.17 M/UL — LOW (ref 4.2–5.8)
RBC # FLD: 14.5 % — SIGNIFICANT CHANGE UP (ref 10.3–14.5)
SODIUM SERPL-SCNC: 143 MMOL/L — SIGNIFICANT CHANGE UP (ref 135–145)
WBC # BLD: 13.1 K/UL — HIGH (ref 3.8–10.5)
WBC # FLD AUTO: 13.1 K/UL — HIGH (ref 3.8–10.5)

## 2017-01-20 PROCEDURE — 86870 RBC ANTIBODY IDENTIFICATION: CPT

## 2017-01-20 PROCEDURE — 76604 US EXAM CHEST: CPT

## 2017-01-20 PROCEDURE — 86901 BLOOD TYPING SEROLOGIC RH(D): CPT

## 2017-01-20 PROCEDURE — 84132 ASSAY OF SERUM POTASSIUM: CPT

## 2017-01-20 PROCEDURE — 83880 ASSAY OF NATRIURETIC PEPTIDE: CPT

## 2017-01-20 PROCEDURE — 87086 URINE CULTURE/COLONY COUNT: CPT

## 2017-01-20 PROCEDURE — 82330 ASSAY OF CALCIUM: CPT

## 2017-01-20 PROCEDURE — 87581 M.PNEUMON DNA AMP PROBE: CPT

## 2017-01-20 PROCEDURE — 86850 RBC ANTIBODY SCREEN: CPT

## 2017-01-20 PROCEDURE — 85027 COMPLETE CBC AUTOMATED: CPT

## 2017-01-20 PROCEDURE — 83605 ASSAY OF LACTIC ACID: CPT

## 2017-01-20 PROCEDURE — 87486 CHLMYD PNEUM DNA AMP PROBE: CPT

## 2017-01-20 PROCEDURE — 92610 EVALUATE SWALLOWING FUNCTION: CPT

## 2017-01-20 PROCEDURE — 96375 TX/PRO/DX INJ NEW DRUG ADDON: CPT

## 2017-01-20 PROCEDURE — 92611 MOTION FLUOROSCOPY/SWALLOW: CPT

## 2017-01-20 PROCEDURE — 94640 AIRWAY INHALATION TREATMENT: CPT

## 2017-01-20 PROCEDURE — 86922 COMPATIBILITY TEST ANTIGLOB: CPT

## 2017-01-20 PROCEDURE — 93005 ELECTROCARDIOGRAM TRACING: CPT

## 2017-01-20 PROCEDURE — 87798 DETECT AGENT NOS DNA AMP: CPT

## 2017-01-20 PROCEDURE — 97162 PT EVAL MOD COMPLEX 30 MIN: CPT

## 2017-01-20 PROCEDURE — 80053 COMPREHEN METABOLIC PANEL: CPT

## 2017-01-20 PROCEDURE — 82947 ASSAY GLUCOSE BLOOD QUANT: CPT

## 2017-01-20 PROCEDURE — 71045 X-RAY EXAM CHEST 1 VIEW: CPT

## 2017-01-20 PROCEDURE — 82435 ASSAY OF BLOOD CHLORIDE: CPT

## 2017-01-20 PROCEDURE — 85014 HEMATOCRIT: CPT

## 2017-01-20 PROCEDURE — 86900 BLOOD TYPING SEROLOGIC ABO: CPT

## 2017-01-20 PROCEDURE — 87633 RESP VIRUS 12-25 TARGETS: CPT

## 2017-01-20 PROCEDURE — 87040 BLOOD CULTURE FOR BACTERIA: CPT

## 2017-01-20 PROCEDURE — 99285 EMERGENCY DEPT VISIT HI MDM: CPT | Mod: 25

## 2017-01-20 PROCEDURE — 82803 BLOOD GASES ANY COMBINATION: CPT

## 2017-01-20 PROCEDURE — 87449 NOS EACH ORGANISM AG IA: CPT

## 2017-01-20 PROCEDURE — 74230 X-RAY XM SWLNG FUNCJ C+: CPT

## 2017-01-20 PROCEDURE — 93308 TTE F-UP OR LMTD: CPT

## 2017-01-20 PROCEDURE — 80048 BASIC METABOLIC PNL TOTAL CA: CPT

## 2017-01-20 PROCEDURE — 96374 THER/PROPH/DIAG INJ IV PUSH: CPT

## 2017-01-20 PROCEDURE — 84484 ASSAY OF TROPONIN QUANT: CPT

## 2017-01-20 PROCEDURE — 86880 COOMBS TEST DIRECT: CPT

## 2017-01-20 PROCEDURE — 81001 URINALYSIS AUTO W/SCOPE: CPT

## 2017-01-20 PROCEDURE — 84295 ASSAY OF SERUM SODIUM: CPT

## 2017-01-20 RX ORDER — ALBUTEROL 90 UG/1
2 AEROSOL, METERED ORAL
Qty: 1 | Refills: 0 | OUTPATIENT
Start: 2017-01-20 | End: 2017-02-19

## 2017-01-20 RX ADMIN — Medication 500 MICROGRAM(S): at 12:04

## 2017-01-20 RX ADMIN — Medication 500 MICROGRAM(S): at 00:05

## 2017-01-20 RX ADMIN — Medication 3 MILLILITER(S): at 12:04

## 2017-01-20 RX ADMIN — Medication 3 MILLILITER(S): at 00:05

## 2017-01-20 RX ADMIN — Medication 500 MICROGRAM(S): at 05:50

## 2017-01-20 RX ADMIN — Medication 2: at 12:04

## 2017-01-20 RX ADMIN — Medication 100 MICROGRAM(S): at 05:50

## 2017-01-20 RX ADMIN — SERTRALINE 50 MILLIGRAM(S): 25 TABLET, FILM COATED ORAL at 12:05

## 2017-01-20 RX ADMIN — Medication 2: at 10:47

## 2017-01-20 RX ADMIN — Medication 81 MILLIGRAM(S): at 12:05

## 2017-01-20 RX ADMIN — Medication 500 MILLIGRAM(S): at 12:05

## 2017-01-20 RX ADMIN — TAMSULOSIN HYDROCHLORIDE 0.4 MILLIGRAM(S): 0.4 CAPSULE ORAL at 05:50

## 2017-01-20 RX ADMIN — Medication 250 MILLIGRAM(S): at 05:50

## 2017-01-20 RX ADMIN — Medication 100 MILLIGRAM(S): at 05:50

## 2017-01-20 RX ADMIN — Medication 1 TABLET(S): at 12:05

## 2017-01-20 RX ADMIN — Medication 3 MILLILITER(S): at 05:50

## 2022-09-28 NOTE — PHYSICAL THERAPY INITIAL EVALUATION ADULT - PHYSICAL ASSIST/NONPHYSICAL ASSIST: STAND/SIT, REHAB EVAL
Wt Readings from Last 3 Encounters:   09/27/22 88 4 kg (194 lb 14 2 oz)   08/30/22 64 9 kg (143 lb)   08/24/22 65 2 kg (143 lb 11 8 oz)     · Patient presented to the ER with complaints of fatigue and shortness of breath; in setting of CHF exacerbation and covid-19 infection  · NT-proBNP 31,278 on admission  ·  last echo noted to be in April 2022; EF 40%  Repeat echo noted to show an EF of 25-30%; not a candidate for lifevest  · Cardiology consulted; appreciate input  · Patient recently switched from Torsemide 20 mg daily to Furosemide 60 mg daily due to GI intolerance for Torsemide/Potassium  · Was initially on 40 mg IV Lasix TID; cardiology has now transitioned patient to 20 mg Torsemide Daily  · Ensure Strict I/O documentation; hines now in place due to urinary retention  · Net negative 6 L since admission    * another extensive conversation had with Dedrick Downey and his wife Rebekah Oneil 9/27 and both are on board to pursue home hospice at this time case management is aware and hospice liaison has been consulted will not pursue thoracentesis at this time given increased risk as decided by patient and wife * verbal cues/nonverbal cues (demo/gestures)

## 2024-04-05 NOTE — PHYSICAL THERAPY INITIAL EVALUATION ADULT - FOLLOWS COMMANDS/ANSWERS QUESTIONS, REHAB EVAL
Patient presented to clinic to have Freestyle zainab sensor removed and a new one placed. Patient presented in clinic with his daughter-in- law who will be removing and placing new sensors. Appropriate teaching to patient and daughter-in-law which included, removing sensor, cleaning the area with an alcohol pad or rubbing alcohol, applying new sensor to posterior aspect of either arm in subq tissue and then using the Freestyle reader to register the new sensor. She voiced understanding. Today, old sensor was removed with toleration and a new sensor was placed to posterior aspect of left arm in the subq area, pt did tolerate this well. [place] was scanned and accepted new sensor. Encouraged patient to return to clinic if needed for further teaching or sensor placement, he voiced understanding.    100% of the time